# Patient Record
Sex: MALE | Race: WHITE | NOT HISPANIC OR LATINO | Employment: OTHER | URBAN - METROPOLITAN AREA
[De-identification: names, ages, dates, MRNs, and addresses within clinical notes are randomized per-mention and may not be internally consistent; named-entity substitution may affect disease eponyms.]

---

## 2018-04-01 ENCOUNTER — HOSPITAL ENCOUNTER (EMERGENCY)
Facility: HOSPITAL | Age: 78
Discharge: HOME | End: 2018-04-01
Attending: EMERGENCY MEDICINE
Payer: MEDICARE

## 2018-04-01 ENCOUNTER — APPOINTMENT (EMERGENCY)
Dept: RADIOLOGY | Facility: HOSPITAL | Age: 78
End: 2018-04-01
Attending: EMERGENCY MEDICINE
Payer: MEDICARE

## 2018-04-01 VITALS
RESPIRATION RATE: 18 BRPM | HEIGHT: 71 IN | DIASTOLIC BLOOD PRESSURE: 98 MMHG | BODY MASS INDEX: 29.12 KG/M2 | WEIGHT: 208 LBS | SYSTOLIC BLOOD PRESSURE: 186 MMHG | OXYGEN SATURATION: 100 % | HEART RATE: 52 BPM | TEMPERATURE: 97.8 F

## 2018-04-01 DIAGNOSIS — R55 SYNCOPE, UNSPECIFIED SYNCOPE TYPE: Primary | ICD-10-CM

## 2018-04-01 LAB
ANION GAP SERPL CALC-SCNC: 5 MEQ/L (ref 3–15)
BASOPHILS # BLD: 0.02 K/UL (ref 0.01–0.1)
BASOPHILS NFR BLD: 0.4 %
BUN SERPL-MCNC: 24 MG/DL (ref 8–20)
CALCIUM SERPL-MCNC: 7.9 MG/DL (ref 8.9–10.3)
CHLORIDE SERPL-SCNC: 104 MMOL/L (ref 98–109)
CO2 SERPL-SCNC: 26 MMOL/L (ref 22–32)
CREAT SERPL-MCNC: 1.2 MG/DL (ref 0.8–1.3)
EOSINOPHIL # BLD: 0.21 K/UL (ref 0.04–0.54)
EOSINOPHIL NFR BLD: 4.6 %
ERYTHROCYTE [DISTWIDTH] IN BLOOD BY AUTOMATED COUNT: 13.5 % (ref 11.6–14.4)
GFR SERPL CREATININE-BSD FRML MDRD: 58.4 ML/MIN/1.73M*2
GLUCOSE SERPL-MCNC: 84 MG/DL (ref 70–99)
HCT VFR BLDCO AUTO: 36.9 % (ref 40–51)
HGB BLD-MCNC: 12.2 G/DL (ref 13.7–17.5)
IMM GRANULOCYTES # BLD AUTO: 0.01 K/UL (ref 0–0.08)
IMM GRANULOCYTES NFR BLD AUTO: 0.2 %
LYMPHOCYTES # BLD: 1.01 K/UL (ref 1.2–3.5)
LYMPHOCYTES NFR BLD: 22.1 %
MCH RBC QN AUTO: 35.9 PG (ref 28–33.2)
MCHC RBC AUTO-ENTMCNC: 33.1 G/DL (ref 32.2–36.5)
MCV RBC AUTO: 108.5 FL (ref 83–98)
MONOCYTES # BLD: 0.28 K/UL (ref 0.3–1)
MONOCYTES NFR BLD: 6.1 %
NEUTROPHILS # BLD: 3.04 K/UL (ref 1.7–7)
NEUTS SEG NFR BLD: 66.6 %
NRBC BLD-RTO: 0 %
PDW BLD AUTO: 8.8 FL (ref 9.4–12.4)
PLATELET # BLD AUTO: 130 K/UL (ref 150–350)
POTASSIUM SERPL-SCNC: 4.7 MMOL/L (ref 3.6–5.1)
RAINBOW HOLD SPECIMEN: NORMAL
RBC # BLD AUTO: 3.4 M/UL (ref 4.5–5.8)
SODIUM SERPL-SCNC: 135 MMOL/L (ref 136–144)
TROPONIN I SERPL-MCNC: <0.02 NG/ML
WBC # BLD AUTO: 4.57 K/UL (ref 3.8–10.5)

## 2018-04-01 PROCEDURE — 96360 HYDRATION IV INFUSION INIT: CPT

## 2018-04-01 PROCEDURE — 99284 EMERGENCY DEPT VISIT MOD MDM: CPT | Mod: 25

## 2018-04-01 PROCEDURE — 93005 ELECTROCARDIOGRAM TRACING: CPT | Performed by: EMERGENCY MEDICINE

## 2018-04-01 PROCEDURE — 71046 X-RAY EXAM CHEST 2 VIEWS: CPT

## 2018-04-01 PROCEDURE — 3E0337Z INTRODUCTION OF ELECTROLYTIC AND WATER BALANCE SUBSTANCE INTO PERIPHERAL VEIN, PERCUTANEOUS APPROACH: ICD-10-PCS | Performed by: EMERGENCY MEDICINE

## 2018-04-01 PROCEDURE — 25800000 HC PHARMACY IV SOLUTIONS: Performed by: EMERGENCY MEDICINE

## 2018-04-01 PROCEDURE — 36415 COLL VENOUS BLD VENIPUNCTURE: CPT | Performed by: EMERGENCY MEDICINE

## 2018-04-01 PROCEDURE — 85025 COMPLETE CBC W/AUTO DIFF WBC: CPT | Performed by: EMERGENCY MEDICINE

## 2018-04-01 PROCEDURE — 80048 BASIC METABOLIC PNL TOTAL CA: CPT | Performed by: EMERGENCY MEDICINE

## 2018-04-01 PROCEDURE — 84484 ASSAY OF TROPONIN QUANT: CPT | Performed by: EMERGENCY MEDICINE

## 2018-04-01 RX ORDER — COLCHICINE 0.6 MG/1
TABLET ORAL
Refills: 0 | Status: ON HOLD | COMMUNITY
Start: 2018-02-06 | End: 2021-01-01 | Stop reason: SDUPTHER

## 2018-04-01 RX ORDER — LISINOPRIL 5 MG/1
5 TABLET ORAL DAILY
Status: ON HOLD | COMMUNITY
End: 2021-01-01 | Stop reason: ENTERED-IN-ERROR

## 2018-04-01 RX ORDER — ALLOPURINOL 100 MG/1
TABLET ORAL
Refills: 0 | Status: ON HOLD | COMMUNITY
Start: 2018-01-03 | End: 2021-01-01 | Stop reason: ENTERED-IN-ERROR

## 2018-04-01 RX ADMIN — SODIUM CHLORIDE 500 ML: 9 INJECTION, SOLUTION INTRAVENOUS at 10:57

## 2018-04-01 NOTE — ED PROVIDER NOTES
EM Resident Note  Chief Complaint: syncope  HPI  77 y.o. male past medical history renal cell carcinoma, gout, htn presenting to the ED with complaint as written above  Pt was in [employer] for Easter when he began feeling light headed. Syncopized while seated in chair and was helped to ground by family. No fall. No tonic clonic movements, tongue biting or incontinence. Upon awakening was not confused and knew where he was. No chest pain, palpitations, SOB, numbness or weakness. Currently feels normal. Before event in [employer] was in normal state of health.   Similar event happened at [employer] on Easter 5 years ago; was seen in ED with no diagnosis made.   Associated sympoms: light headed   Review of Systems  General: no fever, no chills  Skin: no new skin changes   Eyes: no vision changes  ENT: no ear pain, no sore throat  Respiratory: no cough, no shortness of breath, no sputum, no hemoptysis  Cardiovascular: no chest pain, no palpitations  GI: no abdominal pain, no vomiting, no diarrhea  : no frequency, no dysuria   MSK: no joint pain, no leg swelling, no pain  Neuro: no numbness, no weakness    Past History  Past Medical History:   Diagnosis Date   • Gout    • Hypertension    • Renal cancer, right (CMS/HCC) (HCC)      RCC s/p nephrectomy with recurrence now on chemo (oral pill)  Denies hx of CAD, CVA, Pe, DVT  No recent surgery/trauma  Not on beta blocker or rate controlling agent  Past Surgical History:   Procedure Laterality Date   • NEPHRECTOMY Right    • PROSTATECTOMY       Social History     Social History   • Marital status: Single     Spouse name: N/A   • Number of children: N/A   • Years of education: N/A     Occupational History   • Not on file.     Social History Main Topics   • Smoking status: Former Smoker   • Smokeless tobacco: Never Used   • Alcohol use No   • Drug use: No   • Sexual activity: Defer     Other Topics Concern   • Not on file     Social History Narrative   • No narrative on file     History  reviewed. No pertinent family history.  Not in a hospital admission.  No Known Allergies    Physical Exam:  General - alert and oriented x3, under no acute distress  Eyes -  normal sclera and conjunctiva  Head - normocephalic   Neck - normal appearing, trachea midline  Mouth - moist oral mucosa  Cardio - regular rate, regular rhythm, no murmurs  Respiratory - no increased work of breathing, lungs CTA b/l  Abdomen - soft; no tenderness to palpation  Extremities - circulation grossly intact, no extremity swelling, no calf tenderness  Skin: warm, no jaundice  Neuro - alert and awake, normal speech, intact strength and sensation all extremities  Psych- pleasant and cooperative    ED Triage Vitals   Temp Heart Rate Resp BP SpO2   04/01/18 1027 04/01/18 1059 04/01/18 1059 04/01/18 1059 04/01/18 1059   36.6 °C (97.8 °F) (!) 52 20 (!) 175/80 100 %      Temp Source Heart Rate Source Patient Position BP Location FiO2 (%) (Set)   04/01/18 1027 04/01/18 1027 04/01/18 1027 04/01/18 1027 --   Temporal Monitor Lying Right upper arm       MDM:   77 y.o. male after syncopal episode while in [employer]  - VSS; slight bradycardia. Not significant enough to explain patient's syncope, especially given context.  - neurologically intact  Differential diagnosis: vasovagal syncope  Plan: Place IV, labs, EKG, labs  ED Course:  ED Course as of Apr 01 2314   Sun Apr 01, 2018   1046 EKG: sinus bradycardia; normal axis; normal intervals; normal R/S transition; no Q waves; no ST segment changes; no T wave abnormalities; no acute ischemic changes; no findings predisposing to emergent arrythmia    [NP]   2314 Work up shows no abnormalities. Patient had no symptoms while in ED. Dc home.   [NP]      ED Course User Index  [NP] Allan Street MD         Clinical Impressions as of Apr 01 2314   Syncope, unspecified syncope type     Disposition Diagnoses:  1. Syncope, unspecified syncope type       Disposition:  Discharged to home     Allan Street  MD  Resident  04/01/18 1034

## 2018-04-01 NOTE — ED ATTESTATION NOTE
I saw and evaluated the patient.  I reviewed the resident's note and agree with the findings and plan as documented in the note except for my comments if noted below.    Patient is a 77-year-old male with past medical history significant for hypertension and renal cell carcinoma currently on chemotherapy, who presents emergency department complaining of syncopal episode at [employer].  The patient symptoms onset was just prior to arrival.  Patient was standing at [employer] which was unusually more crowded and hot.  The patient felt hot and diaphoretic.  The patient then had lightheadedness and syncopized.  The patient was caught by onlookers and lowered to ground.  The patient denies any head trauma, headache, neck pain, chest pain, dyspnea, abdominal pain, extremity trauma, recent illness, recent decreased p.o.'s or rash.  Patient symptoms have resolved in the emergency department.  The patient feels at his baseline currently.  Physical exam: Vital signs reviewed.  General: Well-developed well-nourished, sitting up in bed, appears comfortable.  Smiles.  HEENT: NCAT, pupils equal round react to light, EOMI, moist mucous membranes.  Neck: Supple, nontender, no meningismus, no JVD.  Chest: Lungs clear to auscultation bilaterally, no rales rhonchi or wheezes.  Heart: Regular rate and rhythm no murmurs rubs or gallops.  Abdomen: Soft, nontender, nondistended, normal active bowel sounds.  Extremities: No cyanosis clubbing or edema.  Skin: Warm and dry, no diaphoresis.  No rash.  Neuro: GCS of 15, awake and alert, 5/5 strength bilateral upper and lower extremities, sensory normal, cranial nerves II to XII intact, coordination normal, normal speech.    Plan: Check labs, EKG, IV fluids.    Impression: Acute syncopal episode.      I was physically present for the key/critical portions of the following procedures: None     Joey Espinoza MD  04/01/18 8250

## 2018-04-02 LAB
ATRIAL RATE: 49
P AXIS: 52
PR INTERVAL: 192
QRS DURATION: 112
QT INTERVAL: 464
QTC CALCULATION(BAZETT): 419
R AXIS: -18
T WAVE AXIS: 61
VENTRICULAR RATE: 49

## 2018-07-08 ENCOUNTER — HOSPITAL ENCOUNTER (EMERGENCY)
Facility: HOSPITAL | Age: 78
Discharge: HOME | End: 2018-07-08
Attending: EMERGENCY MEDICINE
Payer: MEDICARE

## 2018-07-08 VITALS
RESPIRATION RATE: 16 BRPM | DIASTOLIC BLOOD PRESSURE: 80 MMHG | SYSTOLIC BLOOD PRESSURE: 180 MMHG | BODY MASS INDEX: 27.72 KG/M2 | OXYGEN SATURATION: 96 % | TEMPERATURE: 97.6 F | HEIGHT: 71 IN | WEIGHT: 198 LBS | HEART RATE: 58 BPM

## 2018-07-08 DIAGNOSIS — E87.1 HYPONATREMIA: ICD-10-CM

## 2018-07-08 DIAGNOSIS — D64.9 CHRONIC ANEMIA: ICD-10-CM

## 2018-07-08 DIAGNOSIS — R55 VASOVAGAL SYNCOPE: Primary | ICD-10-CM

## 2018-07-08 DIAGNOSIS — E86.0 DEHYDRATION: ICD-10-CM

## 2018-07-08 LAB
ALBUMIN SERPL-MCNC: 2.8 G/DL (ref 3.4–5)
ALP SERPL-CCNC: 63 IU/L (ref 35–126)
ALT SERPL-CCNC: 31 IU/L (ref 16–63)
ANION GAP SERPL CALC-SCNC: 7 MEQ/L (ref 3–15)
AST SERPL-CCNC: 31 IU/L (ref 15–41)
BASOPHILS # BLD: 0.02 K/UL (ref 0.01–0.1)
BASOPHILS NFR BLD: 0.5 %
BILIRUB SERPL-MCNC: 0.7 MG/DL (ref 0.3–1.2)
BUN SERPL-MCNC: 31 MG/DL (ref 8–20)
CALCIUM SERPL-MCNC: 7.7 MG/DL (ref 8.9–10.3)
CHLORIDE SERPL-SCNC: 101 MMOL/L (ref 98–109)
CO2 SERPL-SCNC: 22 MMOL/L (ref 22–32)
CREAT SERPL-MCNC: 1.3 MG/DL (ref 0.8–1.3)
DIFFERENTIAL METHOD BLD: ABNORMAL
EOSINOPHIL # BLD: 0.24 K/UL (ref 0.04–0.54)
EOSINOPHIL NFR BLD: 6 %
ERYTHROCYTE [DISTWIDTH] IN BLOOD BY AUTOMATED COUNT: 14.3 % (ref 11.6–14.4)
GFR SERPL CREATININE-BSD FRML MDRD: 53.3 ML/MIN/1.73M*2
GLUCOSE SERPL-MCNC: 91 MG/DL (ref 70–99)
HCT VFR BLDCO AUTO: 35 % (ref 40–51)
HGB BLD-MCNC: 11.8 G/DL (ref 13.7–17.5)
IMM GRANULOCYTES # BLD AUTO: 0.01 K/UL (ref 0–0.08)
IMM GRANULOCYTES NFR BLD AUTO: 0.3 %
LYMPHOCYTES # BLD: 1 K/UL (ref 1.2–3.5)
LYMPHOCYTES NFR BLD: 25.1 %
MCH RBC QN AUTO: 36.1 PG (ref 28–33.2)
MCHC RBC AUTO-ENTMCNC: 33.7 G/DL (ref 32.2–36.5)
MCV RBC AUTO: 107 FL (ref 83–98)
MONOCYTES # BLD: 0.32 K/UL (ref 0.3–1)
MONOCYTES NFR BLD: 8 %
NEUTROPHILS # BLD: 2.39 K/UL (ref 1.7–7)
NEUTS SEG NFR BLD: 60.1 %
NRBC BLD-RTO: 0 %
PDW BLD AUTO: 9 FL (ref 9.4–12.4)
PLAT MORPH BLD: NORMAL
PLATELET # BLD AUTO: 133 K/UL (ref 150–350)
PLATELET # BLD EST: ABNORMAL 10*3/UL
POIKILOCYTOSIS BLD QL SMEAR: ABNORMAL
POTASSIUM SERPL-SCNC: 4.4 MMOL/L (ref 3.6–5.1)
PROT SERPL-MCNC: 5.2 G/DL (ref 6–8.2)
RAINBOW HOLD SPECIMEN: NORMAL
RBC # BLD AUTO: 3.27 M/UL (ref 4.5–5.8)
SODIUM SERPL-SCNC: 130 MMOL/L (ref 136–144)
TROPONIN I SERPL-MCNC: <0.02 NG/ML
WBC # BLD AUTO: 3.98 K/UL (ref 3.8–10.5)

## 2018-07-08 PROCEDURE — 80053 COMPREHEN METABOLIC PANEL: CPT | Performed by: PHYSICIAN ASSISTANT

## 2018-07-08 PROCEDURE — 99284 EMERGENCY DEPT VISIT MOD MDM: CPT | Mod: 25

## 2018-07-08 PROCEDURE — 93005 ELECTROCARDIOGRAM TRACING: CPT | Performed by: PHYSICIAN ASSISTANT

## 2018-07-08 PROCEDURE — 84484 ASSAY OF TROPONIN QUANT: CPT | Performed by: PHYSICIAN ASSISTANT

## 2018-07-08 PROCEDURE — 96360 HYDRATION IV INFUSION INIT: CPT

## 2018-07-08 PROCEDURE — 3E0337Z INTRODUCTION OF ELECTROLYTIC AND WATER BALANCE SUBSTANCE INTO PERIPHERAL VEIN, PERCUTANEOUS APPROACH: ICD-10-PCS | Performed by: EMERGENCY MEDICINE

## 2018-07-08 PROCEDURE — 96361 HYDRATE IV INFUSION ADD-ON: CPT

## 2018-07-08 PROCEDURE — 85025 COMPLETE CBC W/AUTO DIFF WBC: CPT | Performed by: PHYSICIAN ASSISTANT

## 2018-07-08 PROCEDURE — 36415 COLL VENOUS BLD VENIPUNCTURE: CPT | Performed by: PHYSICIAN ASSISTANT

## 2018-07-08 PROCEDURE — 25800000 HC PHARMACY IV SOLUTIONS: Performed by: PHYSICIAN ASSISTANT

## 2018-07-08 RX ADMIN — SODIUM CHLORIDE 1000 ML: 9 INJECTION, SOLUTION INTRAVENOUS at 12:30

## 2018-07-08 ASSESSMENT — ENCOUNTER SYMPTOMS
SYNCOPE: 1
DIAPHORESIS: 1
HEADACHES: 0
CHILLS: 0
FEVER: 0
SHORTNESS OF BREATH: 0
VOMITING: 0
LIGHT-HEADEDNESS: 1
DIZZINESS: 1
NAUSEA: 0
FATIGUE: 1
ABDOMINAL PAIN: 0

## 2018-07-08 NOTE — ED PROVIDER NOTES
HPI     Chief Complaint   Patient presents with   • Syncope     77 y.o. male presents to ED c/o syncope x today (1045). Pt reports that he was sitting at [employer] this morning when he became diaphoretic and dizzy. He describes the dizziness as lightheadedness. He notes that this morning, he felt more fatigued than normal. Per family, he had +LOC for about 1 min. Family denied apnea, shaking, incontinence seizure activity. Denies injuries or complaints. Pt has had this happen to him in the past, which the most recent being Easter. He states that he was eval and they attributed the LOC to dehydration. Pt states that he has not had dec intake of PO fluids/food  Denies CP, SOB, fever, chills, abd pain, NV, HA, rash, vertiginous dizziness      History provided by:  Patient   used: No    Syncope   Episode history:  Single  Most recent episode:  Today  Timing:  Constant  Progression:  Unchanged  Chronicity:  New  Context: dehydration and sitting down    Witnessed: yes    Relieved by:  None tried  Worsened by:  Nothing  Ineffective treatments:  None tried  Associated symptoms: diaphoresis and dizziness    Associated symptoms: no chest pain, no fever, no headaches, no nausea, no shortness of breath and no vomiting         Patient History     Past Medical History:   Diagnosis Date   • Gout    • Hypertension    • Renal cancer, right (CMS/HCC) (HCC)        Past Surgical History:   Procedure Laterality Date   • NEPHRECTOMY Right    • PROSTATECTOMY         History reviewed. No pertinent family history.    Social History   Substance Use Topics   • Smoking status: Former Smoker   • Smokeless tobacco: Never Used   • Alcohol use No       Systems Reviewed from Nursing Triage:          Review of Systems     Review of Systems   Constitutional: Positive for diaphoresis and fatigue. Negative for chills and fever.   Respiratory: Negative for shortness of breath.    Cardiovascular: Positive for syncope. Negative for chest  "pain.   Gastrointestinal: Negative for abdominal pain, nausea and vomiting.   Skin: Negative for rash.   Neurological: Positive for dizziness, syncope and light-headedness. Negative for headaches.        Physical Exam     ED Triage Vitals [07/08/18 1139]   Temp Pulse Resp BP SpO2   (!) 36 °C (96.8 °F) -- 16 (!) 169/85 96 %      Temp Source Heart Rate Source Patient Position BP Location FiO2 (%) (Set)   Tympanic Monitor Lying Right upper arm --                     Patient Vitals for the past 24 hrs:   BP Temp Temp src Resp SpO2 Height Weight   07/08/18 1139 (!) 169/85 (!) 36 °C (96.8 °F) Tympanic 16 96 % 1.803 m (5' 11\") 89.8 kg (198 lb)           Physical Exam   Constitutional: He is oriented to person, place, and time. He appears well-developed and well-nourished. No distress.   HENT:   Head: Normocephalic and atraumatic.   Mouth/Throat: Oropharynx is clear and moist and mucous membranes are normal. Mucous membranes are not dry.   Eyes: Conjunctivae and EOM are normal. Pupils are equal, round, and reactive to light.   Neck: Normal range of motion.   Cardiovascular: Normal rate, regular rhythm, normal heart sounds and intact distal pulses.    No murmur heard.  Pulmonary/Chest: Effort normal and breath sounds normal. No respiratory distress. He exhibits no tenderness.   Abdominal: Soft. Normal appearance. He exhibits no distension and no mass. There is no tenderness.   Musculoskeletal: Normal range of motion. He exhibits no edema or tenderness.   Neurological: He is alert and oriented to person, place, and time. He has normal strength. He is not disoriented. No cranial nerve deficit or sensory deficit. He exhibits normal muscle tone. He displays a negative Romberg sign. Coordination normal.   Skin: Skin is warm, dry and intact. Capillary refill takes less than 2 seconds. No rash noted. No erythema.   Psychiatric: He has a normal mood and affect. His behavior is normal.   Nursing note and vitals reviewed.       "     ECG 12 lead  Date/Time: 7/8/2018 11:34 AM  Performed by: HAVEN GREGORY  Authorized by: JIMENA BETH     ECG reviewed by ED Physician in the absence of a cardiologist: yes    Interpretation:     Interpretation: abnormal    Rate:     ECG rate:  47    ECG rate assessment: bradycardic    Rhythm:     Rhythm: sinus rhythm    Ectopy:     Ectopy: none    QRS:     QRS axis:  Normal  Conduction:     Conduction: normal          ED Course & MDM     Labs Reviewed - No data to display    No orders to display           MDM         ED Course as of Jul 08 1732   Sun Jul 08, 2018   1320 I: syncope  Labs/tests show NAD  P: consult cardiology  [JCARLOS]   1329 D/w Dr Quinones who agreed that pt should have cardiac workup but can be seen outpatient. He recommended f/u tomorrow to be seen this week.  P: d/c home c cardioloy follow up tomorrow  [JCARLOS]      ED Course User Index  [JCALROS] VICKIE Wood         Clinical Impressions as of Jul 08 1732   Vasovagal syncope   Hyponatremia   Chronic anemia   Dehydration       By signing my name below, I, Torrey Goyal, attest that this documentation has been prepared under the direction and in the presence of LUNA Gregory PA-C.    7/8/2018 12:13 PM      Haven LEE, personally performed the services described in this documentation, as documented by the scribe in my presence, and it is both accurate and complete.  7/8/2018 1:32 PM           Torrey Goyal  07/08/18 1215       VICKIE Wood  07/08/18 1734

## 2018-07-08 NOTE — ED ATTESTATION NOTE
7/8/20181:20 PM  I have personally seen and examined the patient.  I reviewed and agree with the PA/NP/Resident's assessment and plan of care.    My examination, assessment, and plan of care of Marshall Garrison is as follows:  The patient presents with a brief syncopal episode today while at [employer].  The patient has had several episodes of syncope in the past 2 others at [employer] and one other at a restaurant.  All of the syncopal episodes have been within the past 2 years.  He denies any prodromal symptoms of headache or chest pain or shortness of breath or nausea or vomiting or diaphoresis.  This morning he felt fatigued but otherwise felt well.  There is no nausea or vomiting today he denies any headache or dizziness currently.  Denies chest pain or shortness of breath at this time.      Exam: He is awake alert and oriented ×3 and is in no acute distress.  Heart is regular with no murmurs.  Lungs are clear to auscultation bilaterally.  Abdomen is soft nontender with good bowel sounds.  Extremities have no edema.  Moves all extremities well there is good distal pulses.  Neuro exam is nonfocal with a normal motor and sensory and cranial nerve exam.      Impression/Plan: Patient has had what appears to be a brief vasovagal syncopal episodes over the past 2 years.  There is never been a neurologic or cardiac workup therefore we will speak with cardiology today to see if they would like to consider admission to get this done versus discharging to evaluate him further as an outpatient.  Family is concerned that these episodes continue to occur.  Patient is asymptomatic and her ER.     I was physically present for the key/critical portions of the following procedures: None     Gumaro Harrison,   07/08/18 6480

## 2018-07-09 LAB
ATRIAL RATE: 47
P AXIS: 75
PR INTERVAL: 178
QRS DURATION: 98
QT INTERVAL: 462
QTC CALCULATION(BAZETT): 408
R AXIS: -12
T WAVE AXIS: 36
VENTRICULAR RATE: 47

## 2021-01-01 ENCOUNTER — APPOINTMENT (EMERGENCY)
Dept: RADIOLOGY | Facility: HOSPITAL | Age: 81
DRG: 552 | End: 2021-01-01
Attending: EMERGENCY MEDICINE
Payer: MEDICARE

## 2021-01-01 ENCOUNTER — HOSPITAL ENCOUNTER (INPATIENT)
Facility: HOSPITAL | Age: 81
LOS: 8 days | Discharge: NURSING, REHAB OR LONG-TERM CARE | DRG: 552 | End: 2021-12-05
Attending: EMERGENCY MEDICINE | Admitting: STUDENT IN AN ORGANIZED HEALTH CARE EDUCATION/TRAINING PROGRAM
Payer: MEDICARE

## 2021-01-01 ENCOUNTER — APPOINTMENT (INPATIENT)
Dept: RADIOLOGY | Facility: HOSPITAL | Age: 81
DRG: 552 | End: 2021-01-01
Attending: STUDENT IN AN ORGANIZED HEALTH CARE EDUCATION/TRAINING PROGRAM
Payer: MEDICARE

## 2021-01-01 ENCOUNTER — APPOINTMENT (INPATIENT)
Dept: RADIOLOGY | Facility: HOSPITAL | Age: 81
DRG: 552 | End: 2021-01-01
Payer: MEDICARE

## 2021-01-01 ENCOUNTER — APPOINTMENT (INPATIENT)
Dept: RADIOLOGY | Facility: HOSPITAL | Age: 81
DRG: 552 | End: 2021-01-01
Attending: HOSPITALIST
Payer: MEDICARE

## 2021-01-01 ENCOUNTER — APPOINTMENT (INPATIENT)
Dept: CARDIOLOGY | Facility: HOSPITAL | Age: 81
DRG: 552 | End: 2021-01-01
Attending: STUDENT IN AN ORGANIZED HEALTH CARE EDUCATION/TRAINING PROGRAM
Payer: MEDICARE

## 2021-01-01 VITALS
SYSTOLIC BLOOD PRESSURE: 148 MMHG | HEIGHT: 71 IN | BODY MASS INDEX: 27.72 KG/M2 | TEMPERATURE: 97.5 F | HEART RATE: 79 BPM | RESPIRATION RATE: 18 BRPM | OXYGEN SATURATION: 96 % | DIASTOLIC BLOOD PRESSURE: 79 MMHG | WEIGHT: 198 LBS

## 2021-01-01 DIAGNOSIS — R29.898 LEFT LEG WEAKNESS: Primary | ICD-10-CM

## 2021-01-01 DIAGNOSIS — R06.02 SHORTNESS OF BREATH: ICD-10-CM

## 2021-01-01 LAB
ALBUMIN SERPL-MCNC: 2.5 G/DL (ref 3.4–5)
ALBUMIN SERPL-MCNC: 3 G/DL (ref 3.4–5)
ALP SERPL-CCNC: 123 IU/L (ref 35–126)
ALP SERPL-CCNC: 135 IU/L (ref 35–126)
ALT SERPL-CCNC: 15 IU/L (ref 16–63)
ALT SERPL-CCNC: 18 IU/L (ref 16–63)
ANION GAP SERPL CALC-SCNC: 10 MEQ/L (ref 3–15)
ANION GAP SERPL CALC-SCNC: 10 MEQ/L (ref 3–15)
ANION GAP SERPL CALC-SCNC: 11 MEQ/L (ref 3–15)
ANION GAP SERPL CALC-SCNC: 12 MEQ/L (ref 3–15)
ANION GAP SERPL CALC-SCNC: 9 MEQ/L (ref 3–15)
AORTIC ROOT ANNULUS: 3.5 CM
AORTIC VALVE MEAN VELOCITY: 0.86 M/S
AORTIC VALVE VELOCITY TIME INTEGRAL: 23.9 CM
APTT PPP: 137 SEC (ref 23–35)
APTT PPP: 139 SEC (ref 23–35)
APTT PPP: 147 SEC (ref 23–35)
APTT PPP: 35 SEC (ref 23–35)
APTT PPP: 59 SEC (ref 23–35)
APTT PPP: 75 SEC (ref 23–35)
APTT PPP: 95 SEC (ref 23–35)
APTT PPP: 99 SEC (ref 23–35)
APTT PPP: >230 SEC (ref 23–35)
ASCENDING AORTA: 3.7 CM
AST SERPL-CCNC: 25 IU/L (ref 15–41)
AST SERPL-CCNC: 27 IU/L (ref 15–41)
ATRIAL RATE: 54
AV MEAN GRADIENT: 3 MMHG
AV PEAK GRADIENT: 6 MMHG
AV PEAK VELOCITY-S: 1.2 M/S
AV VALVE AREA: 1.61 CM2
AV VALVE AREA: 1.63 CM2
BACTERIA UR CULT: NORMAL
BACTERIA UR CULT: NORMAL
BACTERIA URNS QL MICRO: ABNORMAL /HPF
BASOPHILS # BLD: 0.02 K/UL (ref 0.01–0.1)
BASOPHILS NFR BLD: 0.4 %
BILIRUB DIRECT SERPL-MCNC: 0.1 MG/DL
BILIRUB SERPL-MCNC: 0.6 MG/DL (ref 0.3–1.2)
BILIRUB SERPL-MCNC: 0.8 MG/DL (ref 0.3–1.2)
BILIRUB UR QL STRIP.AUTO: NEGATIVE MG/DL
BNP SERPL-MCNC: 284 PG/ML
BSA FOR ECHO PROCEDURE: 2.12 M2
BUN SERPL-MCNC: 51 MG/DL (ref 8–20)
BUN SERPL-MCNC: 54 MG/DL (ref 8–20)
BUN SERPL-MCNC: 56 MG/DL (ref 8–20)
BUN SERPL-MCNC: 60 MG/DL (ref 8–20)
BUN SERPL-MCNC: 66 MG/DL (ref 8–20)
BUN SERPL-MCNC: 81 MG/DL (ref 8–20)
BUN SERPL-MCNC: 92 MG/DL (ref 8–20)
BUN SERPL-MCNC: 93 MG/DL (ref 8–20)
CALCIUM SERPL-MCNC: 7.8 MG/DL (ref 8.9–10.3)
CALCIUM SERPL-MCNC: 7.9 MG/DL (ref 8.9–10.3)
CALCIUM SERPL-MCNC: 7.9 MG/DL (ref 8.9–10.3)
CALCIUM SERPL-MCNC: 8 MG/DL (ref 8.9–10.3)
CALCIUM SERPL-MCNC: 8.2 MG/DL (ref 8.9–10.3)
CALCIUM SERPL-MCNC: 8.2 MG/DL (ref 8.9–10.3)
CHLORIDE SERPL-SCNC: 106 MEQ/L (ref 98–109)
CHLORIDE SERPL-SCNC: 107 MEQ/L (ref 98–109)
CHLORIDE SERPL-SCNC: 107 MEQ/L (ref 98–109)
CHLORIDE SERPL-SCNC: 108 MEQ/L (ref 98–109)
CHLORIDE SERPL-SCNC: 108 MEQ/L (ref 98–109)
CHLORIDE SERPL-SCNC: 111 MEQ/L (ref 98–109)
CHLORIDE SERPL-SCNC: 113 MEQ/L (ref 98–109)
CHLORIDE SERPL-SCNC: 114 MEQ/L (ref 98–109)
CHOLEST SERPL-MCNC: 131 MG/DL
CK SERPL-CCNC: 33 U/L (ref 16–300)
CLARITY UR REFRACT.AUTO: CLEAR
CO2 SERPL-SCNC: 14 MEQ/L (ref 22–32)
CO2 SERPL-SCNC: 15 MEQ/L (ref 22–32)
CO2 SERPL-SCNC: 16 MEQ/L (ref 22–32)
CO2 SERPL-SCNC: 17 MEQ/L (ref 22–32)
CO2 SERPL-SCNC: 18 MEQ/L (ref 22–32)
CO2 SERPL-SCNC: 19 MEQ/L (ref 22–32)
CO2 SERPL-SCNC: 20 MEQ/L (ref 22–32)
CO2 SERPL-SCNC: 20 MEQ/L (ref 22–32)
COLOR UR AUTO: YELLOW
CREAT SERPL-MCNC: 2.3 MG/DL (ref 0.8–1.3)
CREAT SERPL-MCNC: 2.4 MG/DL (ref 0.8–1.3)
CREAT SERPL-MCNC: 2.4 MG/DL (ref 0.8–1.3)
CREAT SERPL-MCNC: 2.6 MG/DL (ref 0.8–1.3)
CREAT SERPL-MCNC: 2.7 MG/DL (ref 0.8–1.3)
CREAT SERPL-MCNC: 2.7 MG/DL (ref 0.8–1.3)
CREAT SERPL-MCNC: 2.8 MG/DL (ref 0.8–1.3)
CREAT SERPL-MCNC: 2.9 MG/DL (ref 0.8–1.3)
D DIMER PPP IA.FEU-MCNC: 1.95 UG/ML FEU (ref 0–0.5)
DIFFERENTIAL METHOD BLD: ABNORMAL
DOP CALC LVOT STROKE VOLUME: 38.94 ML
E WAVE DECELERATION TIME: 391 MS
E/A RATIO: 0.7
EOSINOPHIL # BLD: 0.08 K/UL (ref 0.04–0.54)
EOSINOPHIL NFR BLD: 1.5 %
ERYTHROCYTE [DISTWIDTH] IN BLOOD BY AUTOMATED COUNT: 15.9 % (ref 11.6–14.4)
ERYTHROCYTE [DISTWIDTH] IN BLOOD BY AUTOMATED COUNT: 16.1 % (ref 11.6–14.4)
ERYTHROCYTE [DISTWIDTH] IN BLOOD BY AUTOMATED COUNT: 16.5 % (ref 11.6–14.4)
ERYTHROCYTE [DISTWIDTH] IN BLOOD BY AUTOMATED COUNT: 16.6 % (ref 11.6–14.4)
ERYTHROCYTE [DISTWIDTH] IN BLOOD BY AUTOMATED COUNT: 16.7 % (ref 11.6–14.4)
ERYTHROCYTE [DISTWIDTH] IN BLOOD BY AUTOMATED COUNT: 16.7 % (ref 11.6–14.4)
ERYTHROCYTE [DISTWIDTH] IN BLOOD BY AUTOMATED COUNT: 16.8 % (ref 11.6–14.4)
ERYTHROCYTE [DISTWIDTH] IN BLOOD BY AUTOMATED COUNT: 17 % (ref 11.6–14.4)
EST RIGHT VENT SYSTOLIC PRESSURE BY TRICUSPID REGURGITATION JET: 25 MMHG
FLUAV RNA SPEC QL NAA+PROBE: NEGATIVE
FLUBV RNA SPEC QL NAA+PROBE: NEGATIVE
FRACTIONAL SHORTENING: 42.1 %
GFR SERPL CREATININE-BSD FRML MDRD: 20.9 ML/MIN/1.73M*2
GFR SERPL CREATININE-BSD FRML MDRD: 21.7 ML/MIN/1.73M*2
GFR SERPL CREATININE-BSD FRML MDRD: 22.7 ML/MIN/1.73M*2
GFR SERPL CREATININE-BSD FRML MDRD: 22.7 ML/MIN/1.73M*2
GFR SERPL CREATININE-BSD FRML MDRD: 23.7 ML/MIN/1.73M*2
GFR SERPL CREATININE-BSD FRML MDRD: 26 ML/MIN/1.73M*2
GFR SERPL CREATININE-BSD FRML MDRD: 26 ML/MIN/1.73M*2
GFR SERPL CREATININE-BSD FRML MDRD: 27.3 ML/MIN/1.73M*2
GLUCOSE SERPL-MCNC: 110 MG/DL (ref 70–99)
GLUCOSE SERPL-MCNC: 124 MG/DL (ref 70–99)
GLUCOSE SERPL-MCNC: 126 MG/DL (ref 70–99)
GLUCOSE SERPL-MCNC: 131 MG/DL (ref 70–99)
GLUCOSE SERPL-MCNC: 89 MG/DL (ref 70–99)
GLUCOSE SERPL-MCNC: 93 MG/DL (ref 70–99)
GLUCOSE SERPL-MCNC: 98 MG/DL (ref 70–99)
GLUCOSE SERPL-MCNC: 98 MG/DL (ref 70–99)
GLUCOSE UR STRIP.AUTO-MCNC: ABNORMAL MG/DL
HCT VFR BLDCO AUTO: 29.1 % (ref 40.1–51)
HCT VFR BLDCO AUTO: 29.5 % (ref 40.1–51)
HCT VFR BLDCO AUTO: 30.1 % (ref 40.1–51)
HCT VFR BLDCO AUTO: 31.6 % (ref 40.1–51)
HCT VFR BLDCO AUTO: 31.9 % (ref 40.1–51)
HCT VFR BLDCO AUTO: 32 % (ref 40.1–51)
HCT VFR BLDCO AUTO: 32.4 % (ref 40.1–51)
HCT VFR BLDCO AUTO: 34.5 % (ref 40.1–51)
HDLC SERPL-MCNC: 41 MG/DL
HDLC SERPL: 3.2 {RATIO}
HGB BLD-MCNC: 10 G/DL (ref 13.7–17.5)
HGB BLD-MCNC: 10.1 G/DL (ref 13.7–17.5)
HGB BLD-MCNC: 10.8 G/DL (ref 13.7–17.5)
HGB BLD-MCNC: 9.2 G/DL (ref 13.7–17.5)
HGB BLD-MCNC: 9.3 G/DL (ref 13.7–17.5)
HGB BLD-MCNC: 9.4 G/DL (ref 13.7–17.5)
HGB BLD-MCNC: 9.9 G/DL (ref 13.7–17.5)
HGB BLD-MCNC: 9.9 G/DL (ref 13.7–17.5)
HGB UR QL STRIP.AUTO: NEGATIVE
HYALINE CASTS #/AREA URNS LPF: ABNORMAL /LPF
IMM GRANULOCYTES # BLD AUTO: 0.03 K/UL (ref 0–0.08)
IMM GRANULOCYTES NFR BLD AUTO: 0.6 %
INR PPP: 1.2
INTERVENTRICULAR SEPTUM: 0.99 CM
KETONES UR STRIP.AUTO-MCNC: NEGATIVE MG/DL
LA/AORTA RATIO: 0.83
LAD 2D: 2.9 CM
LDLC SERPL CALC-MCNC: 77 MG/DL
LEFT INTERNAL DIMENSION IN SYSTOLE: 2.9 CM (ref 3.24–4.91)
LEFT VENTRICULAR INTERNAL DIMENSION IN DIASTOLE: 5.01 CM (ref 5.54–7.69)
LEFT VENTRICULAR POSTERIOR WALL IN END DIASTOLE: 0.91 CM (ref 0.7–1.31)
LEUKOCYTE ESTERASE UR QL STRIP.AUTO: NEGATIVE
LVOT 2D: 2 CM
LVOT A: 3.14 CM2
LVOT MG: 1 MMHG
LVOT MV: 0.45 M/S
LVOT PEAK VELOCITY: 0.61 M/S
LVOT VTI: 12.4 CM
LYMPHOCYTES # BLD: 0.43 K/UL (ref 1.2–3.5)
LYMPHOCYTES NFR BLD: 7.9 %
MAGNESIUM SERPL-MCNC: 1.9 MG/DL (ref 1.8–2.5)
MAGNESIUM SERPL-MCNC: 1.9 MG/DL (ref 1.8–2.5)
MAGNESIUM SERPL-MCNC: 2 MG/DL (ref 1.8–2.5)
MCH RBC QN AUTO: 30.5 PG (ref 28–33.2)
MCH RBC QN AUTO: 30.6 PG (ref 28–33.2)
MCH RBC QN AUTO: 30.7 PG (ref 28–33.2)
MCH RBC QN AUTO: 30.7 PG (ref 28–33.2)
MCH RBC QN AUTO: 31 PG (ref 28–33.2)
MCH RBC QN AUTO: 31.2 PG (ref 28–33.2)
MCH RBC QN AUTO: 31.3 PG (ref 28–33.2)
MCH RBC QN AUTO: 31.5 PG (ref 28–33.2)
MCHC RBC AUTO-ENTMCNC: 30.9 G/DL (ref 32.2–36.5)
MCHC RBC AUTO-ENTMCNC: 30.9 G/DL (ref 32.2–36.5)
MCHC RBC AUTO-ENTMCNC: 31.2 G/DL (ref 32.2–36.5)
MCHC RBC AUTO-ENTMCNC: 31.3 G/DL (ref 32.2–36.5)
MCHC RBC AUTO-ENTMCNC: 31.6 G/DL (ref 32.2–36.5)
MCHC RBC AUTO-ENTMCNC: 31.9 G/DL (ref 32.2–36.5)
MCV RBC AUTO: 100 FL (ref 83–98)
MCV RBC AUTO: 100.3 FL (ref 83–98)
MCV RBC AUTO: 97.3 FL (ref 83–98)
MCV RBC AUTO: 97.7 FL (ref 83–98)
MCV RBC AUTO: 98.1 FL (ref 83–98)
MCV RBC AUTO: 98.3 FL (ref 83–98)
MCV RBC AUTO: 99.1 FL (ref 83–98)
MCV RBC AUTO: 99.7 FL (ref 83–98)
MONOCYTES # BLD: 0.24 K/UL (ref 0.3–1)
MONOCYTES NFR BLD: 4.4 %
MV PEAK A VEL: 0.8 M/S
MV PEAK E VEL: 0.52 M/S
NEUTROPHILS # BLD: 4.62 K/UL (ref 1.7–7)
NEUTS SEG NFR BLD: 85.2 %
NITRITE UR QL STRIP.AUTO: NEGATIVE
NONHDLC SERPL-MCNC: 90 MG/DL
NRBC BLD-RTO: 0 %
P AXIS: 34
PDW BLD AUTO: 8.5 FL (ref 9.4–12.4)
PDW BLD AUTO: 8.7 FL (ref 9.4–12.4)
PDW BLD AUTO: 8.9 FL (ref 9.4–12.4)
PDW BLD AUTO: 8.9 FL (ref 9.4–12.4)
PDW BLD AUTO: 9.4 FL (ref 9.4–12.4)
PDW BLD AUTO: 9.6 FL (ref 9.4–12.4)
PDW BLD AUTO: 9.6 FL (ref 9.4–12.4)
PDW BLD AUTO: 9.7 FL (ref 9.4–12.4)
PH UR STRIP.AUTO: 6 [PH]
PLATELET # BLD AUTO: 106 K/UL (ref 150–350)
PLATELET # BLD AUTO: 111 K/UL (ref 150–350)
PLATELET # BLD AUTO: 111 K/UL (ref 150–350)
PLATELET # BLD AUTO: 129 K/UL (ref 150–350)
PLATELET # BLD AUTO: 179 K/UL (ref 150–350)
PLATELET # BLD AUTO: 202 K/UL (ref 150–350)
PLATELET # BLD AUTO: 251 K/UL (ref 150–350)
PLATELET # BLD AUTO: 271 K/UL (ref 150–350)
POSTERIOR WALL: 0.91 CM
POTASSIUM SERPL-SCNC: 4 MEQ/L (ref 3.6–5.1)
POTASSIUM SERPL-SCNC: 4.3 MEQ/L (ref 3.6–5.1)
POTASSIUM SERPL-SCNC: 4.3 MEQ/L (ref 3.6–5.1)
POTASSIUM SERPL-SCNC: 4.5 MEQ/L (ref 3.6–5.1)
POTASSIUM SERPL-SCNC: 4.5 MEQ/L (ref 3.6–5.1)
POTASSIUM SERPL-SCNC: 4.6 MEQ/L (ref 3.6–5.1)
POTASSIUM SERPL-SCNC: 4.9 MEQ/L (ref 3.6–5.1)
POTASSIUM SERPL-SCNC: 5.1 MEQ/L (ref 3.6–5.1)
PR INTERVAL: 182
PROT SERPL-MCNC: 5.4 G/DL (ref 6–8.2)
PROT SERPL-MCNC: 6.6 G/DL (ref 6–8.2)
PROT UR QL STRIP.AUTO: 3
PROTHROMBIN TIME: 14.3 SEC (ref 12.2–14.5)
QRS DURATION: 104
QT INTERVAL: 438
QTC CALCULATION(BAZETT): 415
R AXIS: -22
RBC # BLD AUTO: 2.92 M/UL (ref 4.5–5.8)
RBC # BLD AUTO: 3 M/UL (ref 4.5–5.8)
RBC # BLD AUTO: 3 M/UL (ref 4.5–5.8)
RBC # BLD AUTO: 3.22 M/UL (ref 4.5–5.8)
RBC # BLD AUTO: 3.23 M/UL (ref 4.5–5.8)
RBC # BLD AUTO: 3.24 M/UL (ref 4.5–5.8)
RBC # BLD AUTO: 3.28 M/UL (ref 4.5–5.8)
RBC # BLD AUTO: 3.53 M/UL (ref 4.5–5.8)
RBC #/AREA URNS HPF: ABNORMAL /HPF
RSV RNA SPEC QL NAA+PROBE: NEGATIVE
SARS-COV-2 RNA RESP QL NAA+PROBE: NEGATIVE
SARS-COV-2 RNA RESP QL NAA+PROBE: NEGATIVE
SODIUM SERPL-SCNC: 135 MEQ/L (ref 136–144)
SODIUM SERPL-SCNC: 136 MEQ/L (ref 136–144)
SODIUM SERPL-SCNC: 137 MEQ/L (ref 136–144)
SODIUM SERPL-SCNC: 137 MEQ/L (ref 136–144)
SODIUM SERPL-SCNC: 139 MEQ/L (ref 136–144)
SP GR UR REFRACT.AUTO: 1.01
SQUAMOUS URNS QL MICRO: 1 /HPF
T WAVE AXIS: 40
T4 FREE SERPL-MCNC: 0.81 NG/DL (ref 0.58–1.64)
TAPSE: 2.76 CM
TR MAX PG: 17 MMHG
TRICUSPID VALVE PEAK REGURGITATION VELOCITY: 2.09 M/S
TRIGL SERPL-MCNC: 67 MG/DL (ref 30–149)
TROPONIN I SERPL-MCNC: <0.02 NG/ML
TSH SERPL DL<=0.05 MIU/L-ACNC: 15.56 MIU/L (ref 0.34–5.6)
UROBILINOGEN UR STRIP-ACNC: 0.2 EU/DL
VENTRICULAR RATE: 54
WBC # BLD AUTO: 11.95 K/UL (ref 3.8–10.5)
WBC # BLD AUTO: 12.3 K/UL (ref 3.8–10.5)
WBC # BLD AUTO: 5.42 K/UL (ref 3.8–10.5)
WBC # BLD AUTO: 6.13 K/UL (ref 3.8–10.5)
WBC # BLD AUTO: 7.1 K/UL (ref 3.8–10.5)
WBC # BLD AUTO: 7.14 K/UL (ref 3.8–10.5)
WBC # BLD AUTO: 8.9 K/UL (ref 3.8–10.5)
WBC # BLD AUTO: 9.95 K/UL (ref 3.8–10.5)
WBC #/AREA URNS HPF: ABNORMAL /HPF
Z-SCORE OF LEFT VENTRICULAR DIMENSION IN END DIASTOLE: -2.65
Z-SCORE OF LEFT VENTRICULAR DIMENSION IN END SYSTOLE: -2.52
Z-SCORE OF LEFT VENTRICULAR POSTERIOR WALL IN END DIASTOLE: -0.26

## 2021-01-01 PROCEDURE — 80048 BASIC METABOLIC PNL TOTAL CA: CPT | Performed by: HOSPITALIST

## 2021-01-01 PROCEDURE — 85730 THROMBOPLASTIN TIME PARTIAL: CPT | Performed by: STUDENT IN AN ORGANIZED HEALTH CARE EDUCATION/TRAINING PROGRAM

## 2021-01-01 PROCEDURE — 83880 ASSAY OF NATRIURETIC PEPTIDE: CPT | Performed by: NURSE PRACTITIONER

## 2021-01-01 PROCEDURE — 63700000 HC SELF-ADMINISTRABLE DRUG: Performed by: STUDENT IN AN ORGANIZED HEALTH CARE EDUCATION/TRAINING PROGRAM

## 2021-01-01 PROCEDURE — 63700000 HC SELF-ADMINISTRABLE DRUG: Performed by: HOSPITALIST

## 2021-01-01 PROCEDURE — 99223 1ST HOSP IP/OBS HIGH 75: CPT | Performed by: STUDENT IN AN ORGANIZED HEALTH CARE EDUCATION/TRAINING PROGRAM

## 2021-01-01 PROCEDURE — G1004 CDSM NDSC: HCPCS

## 2021-01-01 PROCEDURE — 99232 SBSQ HOSP IP/OBS MODERATE 35: CPT | Performed by: HOSPITALIST

## 2021-01-01 PROCEDURE — 99222 1ST HOSP IP/OBS MODERATE 55: CPT | Performed by: INTERNAL MEDICINE

## 2021-01-01 PROCEDURE — 63600000 HC DRUGS/DETAIL CODE: Performed by: HOSPITALIST

## 2021-01-01 PROCEDURE — 97162 PT EVAL MOD COMPLEX 30 MIN: CPT | Mod: GP

## 2021-01-01 PROCEDURE — 80053 COMPREHEN METABOLIC PANEL: CPT | Performed by: EMERGENCY MEDICINE

## 2021-01-01 PROCEDURE — 21400000 HC ROOM AND CARE CCU/INTERMEDIATE

## 2021-01-01 PROCEDURE — 25800000 HC PHARMACY IV SOLUTIONS: Performed by: HOSPITALIST

## 2021-01-01 PROCEDURE — 99231 SBSQ HOSP IP/OBS SF/LOW 25: CPT | Performed by: INTERNAL MEDICINE

## 2021-01-01 PROCEDURE — 99221 1ST HOSP IP/OBS SF/LOW 40: CPT | Performed by: NEUROLOGICAL SURGERY

## 2021-01-01 PROCEDURE — 36415 COLL VENOUS BLD VENIPUNCTURE: CPT | Performed by: EMERGENCY MEDICINE

## 2021-01-01 PROCEDURE — 99285 EMERGENCY DEPT VISIT HI MDM: CPT | Mod: 25

## 2021-01-01 PROCEDURE — 84443 ASSAY THYROID STIM HORMONE: CPT | Performed by: STUDENT IN AN ORGANIZED HEALTH CARE EDUCATION/TRAINING PROGRAM

## 2021-01-01 PROCEDURE — 99291 CRITICAL CARE FIRST HOUR: CPT | Mod: 25

## 2021-01-01 PROCEDURE — 83735 ASSAY OF MAGNESIUM: CPT | Performed by: STUDENT IN AN ORGANIZED HEALTH CARE EDUCATION/TRAINING PROGRAM

## 2021-01-01 PROCEDURE — 85379 FIBRIN DEGRADATION QUANT: CPT | Performed by: STUDENT IN AN ORGANIZED HEALTH CARE EDUCATION/TRAINING PROGRAM

## 2021-01-01 PROCEDURE — 84439 ASSAY OF FREE THYROXINE: CPT | Performed by: STUDENT IN AN ORGANIZED HEALTH CARE EDUCATION/TRAINING PROGRAM

## 2021-01-01 PROCEDURE — 85027 COMPLETE CBC AUTOMATED: CPT | Performed by: HOSPITALIST

## 2021-01-01 PROCEDURE — 85610 PROTHROMBIN TIME: CPT | Performed by: STUDENT IN AN ORGANIZED HEALTH CARE EDUCATION/TRAINING PROGRAM

## 2021-01-01 PROCEDURE — 82550 ASSAY OF CK (CPK): CPT | Performed by: STUDENT IN AN ORGANIZED HEALTH CARE EDUCATION/TRAINING PROGRAM

## 2021-01-01 PROCEDURE — 36415 COLL VENOUS BLD VENIPUNCTURE: CPT | Performed by: HOSPITALIST

## 2021-01-01 PROCEDURE — 85027 COMPLETE CBC AUTOMATED: CPT | Performed by: STUDENT IN AN ORGANIZED HEALTH CARE EDUCATION/TRAINING PROGRAM

## 2021-01-01 PROCEDURE — 99233 SBSQ HOSP IP/OBS HIGH 50: CPT | Performed by: HOSPITALIST

## 2021-01-01 PROCEDURE — 99239 HOSP IP/OBS DSCHRG MGMT >30: CPT | Performed by: HOSPITALIST

## 2021-01-01 PROCEDURE — 63600000 HC DRUGS/DETAIL CODE: Mod: JW | Performed by: STUDENT IN AN ORGANIZED HEALTH CARE EDUCATION/TRAINING PROGRAM

## 2021-01-01 PROCEDURE — 87637 SARSCOV2&INF A&B&RSV AMP PRB: CPT | Performed by: NURSE PRACTITIONER

## 2021-01-01 PROCEDURE — 97535 SELF CARE MNGMENT TRAINING: CPT | Mod: GO

## 2021-01-01 PROCEDURE — 84484 ASSAY OF TROPONIN QUANT: CPT | Performed by: NURSE PRACTITIONER

## 2021-01-01 PROCEDURE — 97530 THERAPEUTIC ACTIVITIES: CPT | Mod: GP

## 2021-01-01 PROCEDURE — 97530 THERAPEUTIC ACTIVITIES: CPT | Mod: GP,CQ

## 2021-01-01 PROCEDURE — 81001 URINALYSIS AUTO W/SCOPE: CPT | Performed by: HOSPITALIST

## 2021-01-01 PROCEDURE — 80076 HEPATIC FUNCTION PANEL: CPT | Performed by: HOSPITALIST

## 2021-01-01 PROCEDURE — 71045 X-RAY EXAM CHEST 1 VIEW: CPT

## 2021-01-01 PROCEDURE — 78582 LUNG VENTILAT&PERFUS IMAGING: CPT | Mod: MG

## 2021-01-01 PROCEDURE — 99233 SBSQ HOSP IP/OBS HIGH 50: CPT | Performed by: STUDENT IN AN ORGANIZED HEALTH CARE EDUCATION/TRAINING PROGRAM

## 2021-01-01 PROCEDURE — 83735 ASSAY OF MAGNESIUM: CPT | Performed by: HOSPITALIST

## 2021-01-01 PROCEDURE — 63600000 HC DRUGS/DETAIL CODE: Performed by: STUDENT IN AN ORGANIZED HEALTH CARE EDUCATION/TRAINING PROGRAM

## 2021-01-01 PROCEDURE — 99292 CRITICAL CARE ADDL 30 MIN: CPT

## 2021-01-01 PROCEDURE — U0003 INFECTIOUS AGENT DETECTION BY NUCLEIC ACID (DNA OR RNA); SEVERE ACUTE RESPIRATORY SYNDROME CORONAVIRUS 2 (SARS-COV-2) (CORONAVIRUS DISEASE [COVID-19]), AMPLIFIED PROBE TECHNIQUE, MAKING USE OF HIGH THROUGHPUT TECHNOLOGIES AS DESCRIBED BY CMS-2020-01-R: HCPCS | Performed by: HOSPITALIST

## 2021-01-01 PROCEDURE — 93970 EXTREMITY STUDY: CPT

## 2021-01-01 PROCEDURE — 97110 THERAPEUTIC EXERCISES: CPT | Mod: GP

## 2021-01-01 PROCEDURE — A9540 TC99M MAA: HCPCS | Performed by: STUDENT IN AN ORGANIZED HEALTH CARE EDUCATION/TRAINING PROGRAM

## 2021-01-01 PROCEDURE — 87086 URINE CULTURE/COLONY COUNT: CPT | Performed by: HOSPITALIST

## 2021-01-01 PROCEDURE — A9567 TECHNETIUM TC-99M AEROSOL: HCPCS | Performed by: STUDENT IN AN ORGANIZED HEALTH CARE EDUCATION/TRAINING PROGRAM

## 2021-01-01 PROCEDURE — 80061 LIPID PANEL: CPT | Performed by: STUDENT IN AN ORGANIZED HEALTH CARE EDUCATION/TRAINING PROGRAM

## 2021-01-01 PROCEDURE — 70551 MRI BRAIN STEM W/O DYE: CPT | Mod: MG

## 2021-01-01 PROCEDURE — 97166 OT EVAL MOD COMPLEX 45 MIN: CPT | Mod: GO

## 2021-01-01 PROCEDURE — 36415 COLL VENOUS BLD VENIPUNCTURE: CPT | Performed by: STUDENT IN AN ORGANIZED HEALTH CARE EDUCATION/TRAINING PROGRAM

## 2021-01-01 PROCEDURE — 80048 BASIC METABOLIC PNL TOTAL CA: CPT | Performed by: STUDENT IN AN ORGANIZED HEALTH CARE EDUCATION/TRAINING PROGRAM

## 2021-01-01 PROCEDURE — 93005 ELECTROCARDIOGRAM TRACING: CPT | Performed by: EMERGENCY MEDICINE

## 2021-01-01 PROCEDURE — 82310 ASSAY OF CALCIUM: CPT | Performed by: HOSPITALIST

## 2021-01-01 PROCEDURE — 93005 ELECTROCARDIOGRAM TRACING: CPT

## 2021-01-01 PROCEDURE — 85730 THROMBOPLASTIN TIME PARTIAL: CPT | Performed by: EMERGENCY MEDICINE

## 2021-01-01 PROCEDURE — 85025 COMPLETE CBC W/AUTO DIFF WBC: CPT | Performed by: EMERGENCY MEDICINE

## 2021-01-01 PROCEDURE — 1123F ACP DISCUSS/DSCN MKR DOCD: CPT | Performed by: STUDENT IN AN ORGANIZED HEALTH CARE EDUCATION/TRAINING PROGRAM

## 2021-01-01 PROCEDURE — 72148 MRI LUMBAR SPINE W/O DYE: CPT | Mod: ME

## 2021-01-01 PROCEDURE — 73630 X-RAY EXAM OF FOOT: CPT | Mod: RT

## 2021-01-01 PROCEDURE — 73610 X-RAY EXAM OF ANKLE: CPT | Mod: RT

## 2021-01-01 PROCEDURE — 93306 TTE W/DOPPLER COMPLETE: CPT

## 2021-01-01 RX ORDER — SODIUM BICARBONATE 650 MG/1
650 TABLET ORAL DAILY
Qty: 30 TABLET | Refills: 0
Start: 2021-01-01 | End: 2022-01-01

## 2021-01-01 RX ORDER — AMLODIPINE BESYLATE 10 MG/1
10 TABLET ORAL DAILY
Qty: 15 TABLET | Refills: 0
Start: 2021-01-01 | End: 2022-01-01

## 2021-01-01 RX ORDER — SODIUM CHLORIDE 9 MG/ML
INJECTION, SOLUTION INTRAVENOUS CONTINUOUS
Status: DISCONTINUED | OUTPATIENT
Start: 2021-01-01 | End: 2021-01-01

## 2021-01-01 RX ORDER — PREDNISONE 20 MG/1
40 TABLET ORAL DAILY
Status: DISCONTINUED | OUTPATIENT
Start: 2021-01-01 | End: 2021-01-01

## 2021-01-01 RX ORDER — MIRTAZAPINE 15 MG/1
15 TABLET, FILM COATED ORAL NIGHTLY
COMMUNITY
Start: 2021-01-01 | End: 2022-09-13

## 2021-01-01 RX ORDER — HYDRALAZINE HYDROCHLORIDE 25 MG/1
25 TABLET, FILM COATED ORAL EVERY 8 HOURS
Qty: 90 TABLET | Refills: 0
Start: 2021-01-01 | End: 2022-01-01

## 2021-01-01 RX ORDER — DEXTROSE 40 %
15-30 GEL (GRAM) ORAL AS NEEDED
Status: DISCONTINUED | OUTPATIENT
Start: 2021-01-01 | End: 2021-01-01 | Stop reason: HOSPADM

## 2021-01-01 RX ORDER — OXYBUTYNIN CHLORIDE 5 MG/1
5 TABLET ORAL DAILY
COMMUNITY
Start: 2021-01-01 | End: 2021-01-01 | Stop reason: HOSPADM

## 2021-01-01 RX ORDER — AMLODIPINE BESYLATE 10 MG/1
5 TABLET ORAL DAILY
Status: ON HOLD | COMMUNITY
End: 2021-01-01 | Stop reason: SDUPTHER

## 2021-01-01 RX ORDER — FUROSEMIDE 20 MG/1
20 TABLET ORAL DAILY
Start: 2021-01-01 | End: 2022-01-01

## 2021-01-01 RX ORDER — LORAZEPAM 0.5 MG/1
0.5 TABLET ORAL EVERY 6 HOURS PRN
COMMUNITY
Start: 2021-05-21 | End: 2021-01-01 | Stop reason: HOSPADM

## 2021-01-01 RX ORDER — PREDNISONE 10 MG/1
20 TABLET ORAL DAILY
Qty: 6 TABLET | Refills: 0
Start: 2021-01-01 | End: 2022-01-01

## 2021-01-01 RX ORDER — DIAZEPAM 5 MG/1
5 TABLET ORAL ONCE
Status: COMPLETED | OUTPATIENT
Start: 2021-01-01 | End: 2021-01-01

## 2021-01-01 RX ORDER — FUROSEMIDE 20 MG/1
TABLET ORAL
Status: ON HOLD | COMMUNITY
Start: 2021-01-01 | End: 2021-01-01 | Stop reason: SDUPTHER

## 2021-01-01 RX ORDER — HYDRALAZINE HYDROCHLORIDE 25 MG/1
25 TABLET, FILM COATED ORAL EVERY 8 HOURS
Status: DISCONTINUED | OUTPATIENT
Start: 2021-01-01 | End: 2021-01-01 | Stop reason: HOSPADM

## 2021-01-01 RX ORDER — ACETAMINOPHEN 325 MG/1
650 TABLET ORAL EVERY 8 HOURS
Status: DISCONTINUED | OUTPATIENT
Start: 2021-01-01 | End: 2021-01-01 | Stop reason: HOSPADM

## 2021-01-01 RX ORDER — FUROSEMIDE 10 MG/ML
20 INJECTION INTRAMUSCULAR; INTRAVENOUS ONCE
Status: COMPLETED | OUTPATIENT
Start: 2021-01-01 | End: 2021-01-01

## 2021-01-01 RX ORDER — IBUPROFEN 200 MG
16-32 TABLET ORAL AS NEEDED
Status: DISCONTINUED | OUTPATIENT
Start: 2021-01-01 | End: 2021-01-01 | Stop reason: HOSPADM

## 2021-01-01 RX ORDER — HYDRALAZINE HYDROCHLORIDE 20 MG/ML
10 INJECTION INTRAMUSCULAR; INTRAVENOUS EVERY 6 HOURS PRN
Status: DISCONTINUED | OUTPATIENT
Start: 2021-01-01 | End: 2021-01-01 | Stop reason: HOSPADM

## 2021-01-01 RX ORDER — AMLODIPINE BESYLATE 10 MG/1
10 TABLET ORAL DAILY
Status: DISCONTINUED | OUTPATIENT
Start: 2021-01-01 | End: 2021-01-01 | Stop reason: HOSPADM

## 2021-01-01 RX ORDER — OXYBUTYNIN CHLORIDE 5 MG/1
5 TABLET ORAL NIGHTLY
Status: DISCONTINUED | OUTPATIENT
Start: 2021-01-01 | End: 2021-01-01

## 2021-01-01 RX ORDER — AMLODIPINE BESYLATE 5 MG/1
5 TABLET ORAL DAILY
Status: DISCONTINUED | OUTPATIENT
Start: 2021-01-01 | End: 2021-01-01

## 2021-01-01 RX ORDER — DIAZEPAM 2 MG/1
5 TABLET ORAL ONCE
Status: ACTIVE | OUTPATIENT
Start: 2021-01-01 | End: 2021-01-01

## 2021-01-01 RX ORDER — TAMSULOSIN HYDROCHLORIDE 0.4 MG/1
0.4 CAPSULE ORAL DAILY
Qty: 30 CAPSULE | Refills: 0
Start: 2021-01-01 | End: 2022-01-01

## 2021-01-01 RX ORDER — COLCHICINE 0.6 MG/1
0.6 TABLET ORAL DAILY
Status: DISCONTINUED | OUTPATIENT
Start: 2021-01-01 | End: 2021-01-01 | Stop reason: HOSPADM

## 2021-01-01 RX ORDER — LEVOTHYROXINE SODIUM 75 UG/1
37.5 TABLET ORAL
Status: DISCONTINUED | OUTPATIENT
Start: 2021-01-01 | End: 2021-01-01 | Stop reason: HOSPADM

## 2021-01-01 RX ORDER — FUROSEMIDE 20 MG/1
20 TABLET ORAL DAILY
Status: DISCONTINUED | OUTPATIENT
Start: 2021-01-01 | End: 2021-01-01 | Stop reason: HOSPADM

## 2021-01-01 RX ORDER — ACETAMINOPHEN 325 MG/1
650 TABLET ORAL EVERY 8 HOURS
Qty: 21 TABLET | Refills: 0
Start: 2021-01-01 | End: 2021-01-01

## 2021-01-01 RX ORDER — OXYBUTYNIN CHLORIDE 5 MG/1
5 TABLET ORAL DAILY
Status: DISCONTINUED | OUTPATIENT
Start: 2021-01-01 | End: 2021-01-01 | Stop reason: HOSPADM

## 2021-01-01 RX ORDER — PANTOPRAZOLE SODIUM 40 MG/1
40 TABLET, DELAYED RELEASE ORAL DAILY
Qty: 4 TABLET | Refills: 0
Start: 2021-01-01 | End: 2021-01-01

## 2021-01-01 RX ORDER — HEPARIN SODIUM 10000 [USP'U]/100ML
100-4000 INJECTION, SOLUTION INTRAVENOUS
Status: DISCONTINUED | OUTPATIENT
Start: 2021-01-01 | End: 2021-01-01

## 2021-01-01 RX ORDER — ACETAMINOPHEN 325 MG/1
650 TABLET ORAL EVERY 8 HOURS
Status: DISCONTINUED | OUTPATIENT
Start: 2021-01-01 | End: 2021-01-01

## 2021-01-01 RX ORDER — LORAZEPAM 0.5 MG/1
0.5 TABLET ORAL EVERY 6 HOURS PRN
Status: DISCONTINUED | OUTPATIENT
Start: 2021-01-01 | End: 2021-01-01

## 2021-01-01 RX ORDER — POLYETHYLENE GLYCOL 3350 17 G/17G
17 POWDER, FOR SOLUTION ORAL DAILY
Status: DISCONTINUED | OUTPATIENT
Start: 2021-01-01 | End: 2021-01-01 | Stop reason: HOSPADM

## 2021-01-01 RX ORDER — ATORVASTATIN CALCIUM 40 MG/1
40 TABLET, FILM COATED ORAL
Status: DISCONTINUED | OUTPATIENT
Start: 2021-01-01 | End: 2021-01-01

## 2021-01-01 RX ORDER — LEVOTHYROXINE SODIUM 25 UG/1
37.5 TABLET ORAL DAILY
Qty: 30 TABLET | Refills: 0
Start: 2021-01-01 | End: 2022-01-01

## 2021-01-01 RX ORDER — MIRTAZAPINE 15 MG/1
15 TABLET, FILM COATED ORAL NIGHTLY
Status: DISCONTINUED | OUTPATIENT
Start: 2021-01-01 | End: 2021-01-01 | Stop reason: HOSPADM

## 2021-01-01 RX ORDER — LEVOTHYROXINE SODIUM 25 UG/1
25 TABLET ORAL
Status: DISCONTINUED | OUTPATIENT
Start: 2021-01-01 | End: 2021-01-01

## 2021-01-01 RX ORDER — PANTOPRAZOLE SODIUM 40 MG/1
40 TABLET, DELAYED RELEASE ORAL DAILY
Status: DISCONTINUED | OUTPATIENT
Start: 2021-01-01 | End: 2021-01-01 | Stop reason: HOSPADM

## 2021-01-01 RX ORDER — ASPIRIN 81 MG/1
162 TABLET ORAL DAILY
Status: DISCONTINUED | OUTPATIENT
Start: 2021-01-01 | End: 2021-01-01

## 2021-01-01 RX ORDER — ACETAMINOPHEN 325 MG/1
650 TABLET ORAL EVERY 6 HOURS PRN
Status: DISCONTINUED | OUTPATIENT
Start: 2021-01-01 | End: 2021-01-01

## 2021-01-01 RX ORDER — POLYETHYLENE GLYCOL 3350 17 G/17G
17 POWDER, FOR SOLUTION ORAL DAILY
Qty: 3 PACKET | Refills: 0
Start: 2021-01-01 | End: 2021-01-01

## 2021-01-01 RX ORDER — COLCHICINE 0.6 MG/1
0.6 TABLET ORAL DAILY
Refills: 0
Start: 2021-01-01 | End: 2022-01-01

## 2021-01-01 RX ORDER — DIAZEPAM 2 MG/1
5 TABLET ORAL ONCE AS NEEDED
Status: COMPLETED | OUTPATIENT
Start: 2021-01-01 | End: 2021-01-01

## 2021-01-01 RX ORDER — SODIUM BICARBONATE 650 MG/1
650 TABLET ORAL DAILY
Status: DISCONTINUED | OUTPATIENT
Start: 2021-01-01 | End: 2021-01-01 | Stop reason: HOSPADM

## 2021-01-01 RX ORDER — TAMSULOSIN HYDROCHLORIDE 0.4 MG/1
0.4 CAPSULE ORAL DAILY
Status: DISCONTINUED | OUTPATIENT
Start: 2021-01-01 | End: 2021-01-01 | Stop reason: HOSPADM

## 2021-01-01 RX ORDER — LEVOTHYROXINE SODIUM 25 UG/1
25 TABLET ORAL DAILY
Status: ON HOLD | COMMUNITY
End: 2021-01-01 | Stop reason: SDUPTHER

## 2021-01-01 RX ORDER — DEXTROSE 50 % IN WATER (D50W) INTRAVENOUS SYRINGE
25 AS NEEDED
Status: DISCONTINUED | OUTPATIENT
Start: 2021-01-01 | End: 2021-01-01 | Stop reason: HOSPADM

## 2021-01-01 RX ADMIN — LEVOTHYROXINE SODIUM 37.5 MCG: 0.07 TABLET ORAL at 06:03

## 2021-01-01 RX ADMIN — AMLODIPINE BESYLATE 10 MG: 10 TABLET ORAL at 08:39

## 2021-01-01 RX ADMIN — ACETAMINOPHEN 650 MG: 325 TABLET, FILM COATED ORAL at 16:40

## 2021-01-01 RX ADMIN — ACETAMINOPHEN 650 MG: 325 TABLET, FILM COATED ORAL at 21:23

## 2021-01-01 RX ADMIN — HYDRALAZINE HYDROCHLORIDE 25 MG: 25 TABLET ORAL at 21:27

## 2021-01-01 RX ADMIN — HEPARIN SODIUM 1600 UNITS/HR: 10000 INJECTION, SOLUTION INTRAVENOUS at 17:53

## 2021-01-01 RX ADMIN — METHYLPREDNISOLONE SODIUM SUCCINATE 40 MG: 40 INJECTION, POWDER, FOR SOLUTION INTRAMUSCULAR; INTRAVENOUS at 08:49

## 2021-01-01 RX ADMIN — LEVOTHYROXINE SODIUM 25 MCG: 0.03 TABLET ORAL at 05:35

## 2021-01-01 RX ADMIN — TAMSULOSIN HYDROCHLORIDE 0.4 MG: 0.4 CAPSULE ORAL at 08:22

## 2021-01-01 RX ADMIN — POLYETHYLENE GLYCOL 3350 17 G: 17 POWDER, FOR SOLUTION ORAL at 09:53

## 2021-01-01 RX ADMIN — DIAZEPAM 5 MG: 2 TABLET ORAL at 10:58

## 2021-01-01 RX ADMIN — COLCHICINE 0.6 MG: 0.6 TABLET, FILM COATED ORAL at 10:01

## 2021-01-01 RX ADMIN — PREDNISONE 40 MG: 20 TABLET ORAL at 09:52

## 2021-01-01 RX ADMIN — ACETAMINOPHEN 650 MG: 325 TABLET, FILM COATED ORAL at 15:45

## 2021-01-01 RX ADMIN — HYDRALAZINE HYDROCHLORIDE 10 MG: 20 INJECTION INTRAMUSCULAR; INTRAVENOUS at 18:04

## 2021-01-01 RX ADMIN — LEVOTHYROXINE SODIUM 37.5 MCG: 0.07 TABLET ORAL at 05:48

## 2021-01-01 RX ADMIN — AMLODIPINE BESYLATE 10 MG: 10 TABLET ORAL at 10:01

## 2021-01-01 RX ADMIN — MIRTAZAPINE 15 MG: 15 TABLET, FILM COATED ORAL at 20:18

## 2021-01-01 RX ADMIN — LEVOTHYROXINE SODIUM 37.5 MCG: 0.07 TABLET ORAL at 06:43

## 2021-01-01 RX ADMIN — ACETAMINOPHEN 650 MG: 325 TABLET, FILM COATED ORAL at 21:12

## 2021-01-01 RX ADMIN — COLCHICINE 0.6 MG: 0.6 TABLET, FILM COATED ORAL at 09:52

## 2021-01-01 RX ADMIN — PANTOPRAZOLE SODIUM 40 MG: 40 TABLET, DELAYED RELEASE ORAL at 09:52

## 2021-01-01 RX ADMIN — ACETAMINOPHEN 650 MG: 325 TABLET, FILM COATED ORAL at 06:43

## 2021-01-01 RX ADMIN — AMLODIPINE BESYLATE 10 MG: 10 TABLET ORAL at 08:22

## 2021-01-01 RX ADMIN — LEVOTHYROXINE SODIUM 25 MCG: 0.03 TABLET ORAL at 04:55

## 2021-01-01 RX ADMIN — HYDRALAZINE HYDROCHLORIDE 25 MG: 25 TABLET ORAL at 17:15

## 2021-01-01 RX ADMIN — APIXABAN 2.5 MG: 2.5 TABLET, FILM COATED ORAL at 08:22

## 2021-01-01 RX ADMIN — ACETAMINOPHEN 650 MG: 325 TABLET, FILM COATED ORAL at 09:59

## 2021-01-01 RX ADMIN — SODIUM CHLORIDE: 9 INJECTION, SOLUTION INTRAVENOUS at 08:40

## 2021-01-01 RX ADMIN — FUROSEMIDE 20 MG: 20 TABLET ORAL at 09:59

## 2021-01-01 RX ADMIN — HYDRALAZINE HYDROCHLORIDE 25 MG: 25 TABLET ORAL at 06:26

## 2021-01-01 RX ADMIN — MIRTAZAPINE 15 MG: 15 TABLET, FILM COATED ORAL at 21:24

## 2021-01-01 RX ADMIN — SODIUM BICARBONATE 650 MG: 650 TABLET ORAL at 12:12

## 2021-01-01 RX ADMIN — APIXABAN 2.5 MG: 2.5 TABLET, FILM COATED ORAL at 20:23

## 2021-01-01 RX ADMIN — ASPIRIN 162 MG: 81 TABLET, COATED ORAL at 09:59

## 2021-01-01 RX ADMIN — ACETAMINOPHEN 650 MG: 325 TABLET, FILM COATED ORAL at 06:03

## 2021-01-01 RX ADMIN — HYDRALAZINE HYDROCHLORIDE 25 MG: 25 TABLET ORAL at 21:12

## 2021-01-01 RX ADMIN — SODIUM CHLORIDE: 9 INJECTION, SOLUTION INTRAVENOUS at 21:14

## 2021-01-01 RX ADMIN — PANTOPRAZOLE SODIUM 40 MG: 40 TABLET, DELAYED RELEASE ORAL at 10:01

## 2021-01-01 RX ADMIN — MIRTAZAPINE 15 MG: 15 TABLET, FILM COATED ORAL at 21:15

## 2021-01-01 RX ADMIN — HYDRALAZINE HYDROCHLORIDE 25 MG: 25 TABLET ORAL at 14:53

## 2021-01-01 RX ADMIN — LEVOTHYROXINE SODIUM 37.5 MCG: 0.07 TABLET ORAL at 06:33

## 2021-01-01 RX ADMIN — OXYBUTYNIN CHLORIDE 5 MG: 5 TABLET ORAL at 10:00

## 2021-01-01 RX ADMIN — HYDRALAZINE HYDROCHLORIDE 25 MG: 25 TABLET ORAL at 15:15

## 2021-01-01 RX ADMIN — HYDRALAZINE HYDROCHLORIDE 25 MG: 25 TABLET ORAL at 14:01

## 2021-01-01 RX ADMIN — COLCHICINE 0.6 MG: 0.6 TABLET, FILM COATED ORAL at 08:22

## 2021-01-01 RX ADMIN — ASPIRIN 162 MG: 81 TABLET, COATED ORAL at 10:04

## 2021-01-01 RX ADMIN — HYDRALAZINE HYDROCHLORIDE 25 MG: 25 TABLET ORAL at 20:23

## 2021-01-01 RX ADMIN — APIXABAN 2.5 MG: 2.5 TABLET, FILM COATED ORAL at 08:40

## 2021-01-01 RX ADMIN — APIXABAN 2.5 MG: 2.5 TABLET, FILM COATED ORAL at 21:24

## 2021-01-01 RX ADMIN — OXYBUTYNIN CHLORIDE 5 MG: 5 TABLET ORAL at 10:04

## 2021-01-01 RX ADMIN — SODIUM CHLORIDE: 9 INJECTION, SOLUTION INTRAVENOUS at 22:32

## 2021-01-01 RX ADMIN — AMLODIPINE BESYLATE 10 MG: 10 TABLET ORAL at 08:36

## 2021-01-01 RX ADMIN — DIAZEPAM 5 MG: 5 TABLET ORAL at 10:14

## 2021-01-01 RX ADMIN — HYDRALAZINE HYDROCHLORIDE 25 MG: 25 TABLET ORAL at 05:48

## 2021-01-01 RX ADMIN — AMLODIPINE BESYLATE 10 MG: 10 TABLET ORAL at 08:49

## 2021-01-01 RX ADMIN — MIRTAZAPINE 15 MG: 15 TABLET, FILM COATED ORAL at 20:23

## 2021-01-01 RX ADMIN — AMLODIPINE BESYLATE 10 MG: 10 TABLET ORAL at 09:52

## 2021-01-01 RX ADMIN — APIXABAN 2.5 MG: 2.5 TABLET, FILM COATED ORAL at 08:37

## 2021-01-01 RX ADMIN — HYDRALAZINE HYDROCHLORIDE 25 MG: 25 TABLET ORAL at 06:05

## 2021-01-01 RX ADMIN — TAMSULOSIN HYDROCHLORIDE 0.4 MG: 0.4 CAPSULE ORAL at 09:52

## 2021-01-01 RX ADMIN — HYDRALAZINE HYDROCHLORIDE 25 MG: 25 TABLET ORAL at 21:24

## 2021-01-01 RX ADMIN — AMLODIPINE BESYLATE 10 MG: 10 TABLET ORAL at 10:04

## 2021-01-01 RX ADMIN — ACETAMINOPHEN 650 MG: 325 TABLET, FILM COATED ORAL at 17:13

## 2021-01-01 RX ADMIN — ACETAMINOPHEN 650 MG: 325 TABLET, FILM COATED ORAL at 15:15

## 2021-01-01 RX ADMIN — APIXABAN 2.5 MG: 2.5 TABLET, FILM COATED ORAL at 10:02

## 2021-01-01 RX ADMIN — PANTOPRAZOLE SODIUM 40 MG: 40 TABLET, DELAYED RELEASE ORAL at 08:22

## 2021-01-01 RX ADMIN — HYDRALAZINE HYDROCHLORIDE 25 MG: 25 TABLET ORAL at 06:43

## 2021-01-01 RX ADMIN — HEPARIN SODIUM 1350 UNITS/HR: 10000 INJECTION, SOLUTION INTRAVENOUS at 10:43

## 2021-01-01 RX ADMIN — MIRTAZAPINE 15 MG: 15 TABLET, FILM COATED ORAL at 21:07

## 2021-01-01 RX ADMIN — PREDNISONE 40 MG: 20 TABLET ORAL at 10:01

## 2021-01-01 RX ADMIN — ACETAMINOPHEN 650 MG: 325 TABLET, FILM COATED ORAL at 15:17

## 2021-01-01 RX ADMIN — ACETAMINOPHEN 650 MG: 325 TABLET, FILM COATED ORAL at 21:27

## 2021-01-01 RX ADMIN — SODIUM CHLORIDE: 9 INJECTION, SOLUTION INTRAVENOUS at 10:07

## 2021-01-01 RX ADMIN — MIRTAZAPINE 15 MG: 15 TABLET, FILM COATED ORAL at 21:27

## 2021-01-01 RX ADMIN — PANTOPRAZOLE SODIUM 40 MG: 40 TABLET, DELAYED RELEASE ORAL at 08:36

## 2021-01-01 RX ADMIN — APIXABAN 2.5 MG: 2.5 TABLET, FILM COATED ORAL at 08:49

## 2021-01-01 RX ADMIN — KIT FOR THE PREPARATION OF TECHNETIUM TC 99M PENTETATE 27.5 MILLICURIE: 20 INJECTION, POWDER, LYOPHILIZED, FOR SOLUTION INTRAVENOUS; RESPIRATORY (INHALATION) at 14:00

## 2021-01-01 RX ADMIN — ASPIRIN 162 MG: 81 TABLET, COATED ORAL at 17:51

## 2021-01-01 RX ADMIN — HYDRALAZINE HYDROCHLORIDE 25 MG: 25 TABLET ORAL at 06:03

## 2021-01-01 RX ADMIN — ATORVASTATIN CALCIUM 40 MG: 40 TABLET, FILM COATED ORAL at 20:47

## 2021-01-01 RX ADMIN — APIXABAN 2.5 MG: 2.5 TABLET, FILM COATED ORAL at 20:18

## 2021-01-01 RX ADMIN — ACETAMINOPHEN 650 MG: 325 TABLET, FILM COATED ORAL at 06:05

## 2021-01-01 RX ADMIN — LEVOTHYROXINE SODIUM 37.5 MCG: 0.07 TABLET ORAL at 06:05

## 2021-01-01 RX ADMIN — AMLODIPINE BESYLATE 10 MG: 10 TABLET ORAL at 09:59

## 2021-01-01 RX ADMIN — OXYBUTYNIN CHLORIDE 5 MG: 5 TABLET ORAL at 08:39

## 2021-01-01 RX ADMIN — FUROSEMIDE 20 MG: 20 TABLET ORAL at 10:04

## 2021-01-01 RX ADMIN — LEVOTHYROXINE SODIUM 37.5 MCG: 0.07 TABLET ORAL at 06:26

## 2021-01-01 RX ADMIN — HYDRALAZINE HYDROCHLORIDE 25 MG: 25 TABLET ORAL at 15:17

## 2021-01-01 RX ADMIN — HYDRALAZINE HYDROCHLORIDE 25 MG: 25 TABLET ORAL at 14:17

## 2021-01-01 RX ADMIN — ACETAMINOPHEN 650 MG: 325 TABLET, FILM COATED ORAL at 06:26

## 2021-01-01 RX ADMIN — MIRTAZAPINE 15 MG: 15 TABLET, FILM COATED ORAL at 21:12

## 2021-01-01 RX ADMIN — METOPROLOL SUCCINATE 12.5 MG: 25 TABLET, EXTENDED RELEASE ORAL at 09:58

## 2021-01-01 RX ADMIN — APIXABAN 2.5 MG: 2.5 TABLET, FILM COATED ORAL at 21:27

## 2021-01-01 RX ADMIN — HYDRALAZINE HYDROCHLORIDE 25 MG: 25 TABLET ORAL at 20:18

## 2021-01-01 RX ADMIN — SODIUM CHLORIDE: 9 INJECTION, SOLUTION INTRAVENOUS at 21:24

## 2021-01-01 RX ADMIN — SODIUM CHLORIDE: 9 INJECTION, SOLUTION INTRAVENOUS at 11:20

## 2021-01-01 RX ADMIN — PREDNISONE 30 MG: 20 TABLET ORAL at 08:37

## 2021-01-01 RX ADMIN — COLCHICINE 0.6 MG: 0.6 TABLET, FILM COATED ORAL at 08:49

## 2021-01-01 RX ADMIN — FUROSEMIDE 20 MG: 10 INJECTION, SOLUTION INTRAMUSCULAR; INTRAVENOUS at 20:43

## 2021-01-01 RX ADMIN — ATORVASTATIN CALCIUM 40 MG: 40 TABLET, FILM COATED ORAL at 18:33

## 2021-01-01 RX ADMIN — APIXABAN 2.5 MG: 2.5 TABLET, FILM COATED ORAL at 21:12

## 2021-01-01 RX ADMIN — KIT FOR THE PREPARATION OF TECHNETIUM TC 99M ALBUMIN AGGREGATED 4.4 MILLICURIE: 2.5 INJECTION, POWDER, FOR SOLUTION INTRAVENOUS at 14:14

## 2021-01-01 RX ADMIN — ACETAMINOPHEN 650 MG: 325 TABLET, FILM COATED ORAL at 14:17

## 2021-01-01 RX ADMIN — HYDRALAZINE HYDROCHLORIDE 25 MG: 25 TABLET ORAL at 15:45

## 2021-01-01 RX ADMIN — HYDRALAZINE HYDROCHLORIDE 25 MG: 25 TABLET ORAL at 06:33

## 2021-01-01 RX ADMIN — POLYETHYLENE GLYCOL 3350 17 G: 17 POWDER, FOR SOLUTION ORAL at 16:40

## 2021-01-01 RX ADMIN — PREDNISONE 30 MG: 20 TABLET ORAL at 08:22

## 2021-01-01 RX ADMIN — APIXABAN 2.5 MG: 2.5 TABLET, FILM COATED ORAL at 09:52

## 2021-01-01 RX ADMIN — SODIUM CHLORIDE: 9 INJECTION, SOLUTION INTRAVENOUS at 09:52

## 2021-01-01 RX ADMIN — COLCHICINE 0.6 MG: 0.6 TABLET, FILM COATED ORAL at 08:36

## 2021-01-01 RX ADMIN — TAMSULOSIN HYDROCHLORIDE 0.4 MG: 0.4 CAPSULE ORAL at 08:37

## 2021-01-01 RX ADMIN — METOPROLOL SUCCINATE 12.5 MG: 25 TABLET, EXTENDED RELEASE ORAL at 18:33

## 2021-01-01 ASSESSMENT — COGNITIVE AND FUNCTIONAL STATUS - GENERAL
STANDING UP FROM CHAIR USING ARMS: 2 - A LOT
CLIMB 3 TO 5 STEPS WITH RAILING: 1 - TOTAL
WALKING IN HOSPITAL ROOM: 2 - A LOT
AFFECT: ANXIOUS;CONFUSED;FLAT/BLUNTED AFFECT
HELP NEEDED FOR PERSONAL GROOMING: 3 - A LITTLE
MOVING TO AND FROM BED TO CHAIR: 2 - A LOT
DRESSING REGULAR LOWER BODY CLOTHING: 4 - NONE
MOVING TO AND FROM BED TO CHAIR: 2 - A LOT
TOILETING: 4 - NONE
MOVING TO AND FROM BED TO CHAIR: 2 - A LOT
STANDING UP FROM CHAIR USING ARMS: 4 - NONE
CLIMB 3 TO 5 STEPS WITH RAILING: 4 - NONE
EATING MEALS: 3 - A LITTLE
DRESSING REGULAR LOWER BODY CLOTHING: 1 - TOTAL
STANDING UP FROM CHAIR USING ARMS: 2 - A LOT
AFFECT: WFL
EATING MEALS: 4 - NONE
WALKING IN HOSPITAL ROOM: 1 - TOTAL
AFFECT: WFL;FLAT/BLUNTED AFFECT
DRESSING REGULAR LOWER BODY CLOTHING: 1 - TOTAL
WALKING IN HOSPITAL ROOM: 2 - A LOT
HELP NEEDED FOR BATHING: 2 - A LOT
WALKING IN HOSPITAL ROOM: 1 - TOTAL
HELP NEEDED FOR PERSONAL GROOMING: 3 - A LITTLE
DRESSING REGULAR UPPER BODY CLOTHING: 2 - A LOT
EATING MEALS: 3 - A LITTLE
MOVING TO AND FROM BED TO CHAIR: 4 - NONE
EATING MEALS: 3 - A LITTLE
HELP NEEDED FOR BATHING: 2 - A LOT
MOVING TO AND FROM BED TO CHAIR: 2 - A LOT
CLIMB 3 TO 5 STEPS WITH RAILING: 1 - TOTAL
HELP NEEDED FOR BATHING: 2 - A LOT
WALKING IN HOSPITAL ROOM: 4 - NONE
TOILETING: 1 - TOTAL
AFFECT: WFL;FLAT/BLUNTED AFFECT
STANDING UP FROM CHAIR USING ARMS: 2 - A LOT
AFFECT: FLAT/BLUNTED AFFECT
AFFECT: WFL
HELP NEEDED FOR BATHING: 4 - NONE
STANDING UP FROM CHAIR USING ARMS: 1 - TOTAL
TOILETING: 1 - TOTAL
CLIMB 3 TO 5 STEPS WITH RAILING: 2 - A LOT
STANDING UP FROM CHAIR USING ARMS: 2 - A LOT
WALKING IN HOSPITAL ROOM: 1 - TOTAL
CLIMB 3 TO 5 STEPS WITH RAILING: 1 - TOTAL
TOILETING: 2 - A LOT
DRESSING REGULAR UPPER BODY CLOTHING: 3 - A LITTLE
DRESSING REGULAR UPPER BODY CLOTHING: 4 - NONE
CLIMB 3 TO 5 STEPS WITH RAILING: 2 - A LOT
DRESSING REGULAR LOWER BODY CLOTHING: 2 - A LOT
DRESSING REGULAR UPPER BODY CLOTHING: 2 - A LOT
MOVING TO AND FROM BED TO CHAIR: 1 - TOTAL
HELP NEEDED FOR PERSONAL GROOMING: 4 - NONE
HELP NEEDED FOR PERSONAL GROOMING: 3 - A LITTLE

## 2021-01-01 ASSESSMENT — ENCOUNTER SYMPTOMS
BRUISES/BLEEDS EASILY: 0
FATIGUE: 1
COUGH: 0
CONSTIPATION: 0
DYSURIA: 0
ABDOMINAL PAIN: 0
SHORTNESS OF BREATH: 1
NAUSEA: 0
VOMITING: 0
CONFUSION: 0
DIFFICULTY URINATING: 0
HEADACHES: 0
FEVER: 0
DIARRHEA: 0

## 2021-01-01 ASSESSMENT — PATIENT HEALTH QUESTIONNAIRE - PHQ9: SUM OF ALL RESPONSES TO PHQ9 QUESTIONS 1 & 2: 0

## 2021-04-13 DIAGNOSIS — Z23 ENCOUNTER FOR IMMUNIZATION: ICD-10-CM

## 2021-11-27 PROBLEM — I10 HYPERTENSION: Status: ACTIVE | Noted: 2021-01-01

## 2021-11-27 PROBLEM — I48.0 PAROXYSMAL A-FIB (CMS/HCC): Status: ACTIVE | Noted: 2021-01-01

## 2021-11-27 PROBLEM — R29.898 LEFT LEG WEAKNESS: Status: ACTIVE | Noted: 2021-01-01

## 2021-11-27 PROBLEM — N18.4 CKD (CHRONIC KIDNEY DISEASE) STAGE 4, GFR 15-29 ML/MIN (CMS/HCC): Status: ACTIVE | Noted: 2021-01-01

## 2021-11-27 PROBLEM — R06.02 SOB (SHORTNESS OF BREATH) ON EXERTION: Status: ACTIVE | Noted: 2021-01-01

## 2021-11-27 PROBLEM — C64.9 RENAL CANCER (CMS/HCC): Status: ACTIVE | Noted: 2021-01-01

## 2021-11-27 NOTE — ED PROVIDER NOTES
HPI     81-year-old male presents to the emergency department for complaints of shortness of breath and progressing fatigue.  He does take Eliquis for history of atrial fibrillation about 1 year ago and has been on it for about 9 months.  He states over last 2 days he forgot his prescription.  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  On chemotherapy.  Chronic leg swelling.  No further complaints.    Past Medical History:   Diagnosis Date   • Gout    • Hypertension    • Renal cancer, right (CMS/HCC) (HCC)        Past Surgical History:   Procedure Laterality Date   • NEPHRECTOMY Right    • PROSTATECTOMY         Allergies   Allergen Reactions   • No Known Drug Allergies        Social History     Tobacco Use   Smoking Status Former Smoker   Smokeless Tobacco Never Used       Social History     Substance and Sexual Activity   Alcohol Use No       Social History     Substance and Sexual Activity   Drug Use No       No LMP for male patient.    Review of Systems   Constitutional: Positive for fatigue. Negative for fever.   HENT: Negative for congestion.    Respiratory: Positive for shortness of breath. Negative for cough.    Cardiovascular: Positive for leg swelling. Negative for chest pain.   Gastrointestinal: Negative for abdominal pain, constipation, diarrhea, nausea and vomiting.   Genitourinary: Negative for difficulty urinating and dysuria.   Musculoskeletal: Negative for gait problem.   Skin: Negative for pallor.   Allergic/Immunologic: Negative for immunocompromised state.   Neurological: Negative for headaches.   Hematological: Does not bruise/bleed easily.   Psychiatric/Behavioral: Negative for confusion.       Vitals:    11/27/21 1301 11/27/21 1430 11/27/21 1517 11/27/21 1548   BP: (!) 182/88 (!) 196/95  (!) 174/83   BP Location: Right upper arm Left upper arm     Patient  "Position: Sitting Sitting     Pulse: (!) 56 (!) 54 66 (!) 52   Resp: (!) 24 (!) 24 (!) 24 17   Temp: 36.3 °C (97.4 °F)      TempSrc: Temporal      SpO2: 99% 98% 99% 98%   Weight: 89.8 kg (198 lb)      Height: 1.803 m (5' 11\")          Physical Exam  Vitals and nursing note reviewed.   Constitutional:       General: He is not in acute distress.     Appearance: He is not toxic-appearing.   HENT:      Head: Normocephalic.      Mouth/Throat:      Pharynx: Oropharynx is clear.   Cardiovascular:      Rate and Rhythm: Regular rhythm. Bradycardia present.   Pulmonary:      Effort: Pulmonary effort is normal.      Breath sounds: Normal breath sounds.   Musculoskeletal:         General: Normal range of motion.      Cervical back: Neck supple.      Comments: Pitting edema in the bilateral lower extremities slightly worse in the right lower extremity.  There is no erythema or tenderness to palpation of the calves.   Skin:     General: Skin is warm and dry.      Capillary Refill: Capillary refill takes less than 2 seconds.      Coloration: Skin is not pale.   Neurological:      Mental Status: He is alert and oriented to person, place, and time.      GCS: GCS eye subscore is 4. GCS verbal subscore is 5. GCS motor subscore is 6.      Cranial Nerves: Cranial nerves are intact.   Psychiatric:         Mood and Affect: Mood normal.         Behavior: Behavior normal.         Procedures    ED Course as of 11/27/21 1649   Sat Nov 27, 2021   1335 Impression: Shortness of breath and fatigue.  History of A. fib and on Eliquis.  Forgot Eliquis x2 days as he lost his prescription.  Right nephrectomy.  History of cancer.  Leg swelling.    Plan: CBC, CMP, BNP, troponin, chest x-ray, bilateral venous duplex, EKG.  Will await kidney function and consider CT PE study.   [AO]   1337 Troponin I: <0.02 [AO]   1450 BNP(!): 284  May be abnormal due to renal disease. [AO]   1450 Creatinine(!): 2.4  2.25 on 11/16.  Consistent with prior. [AO]   1450 " eGFR(!): 26.0 [AO]   1450 CXR:   IMPRESSION: No acute pulmonary abnormality. [AO]   1456 Daughter now reports patient left leg keeps giving out/weak and having trouble walking. Exertional SOB.  Head CT ordered.  Left leg drift noted on exam. [AO]   1553 B/l venous duplex:   IMPRESSION:  No evidence for bilateral deep venous thrombosis in the visualized veins. [AO]   1620 Head CT:   IMPRESSION:  No acute intracranial process. [AO]   1623 Patient and family updated on all results.  Patient is a full code.  In agreement with admission.  [facility] paged for admission. [AO]   1626 Dr. Espinoza spoke to [facility].  Will admit to telemetry. [AO]      ED Course User Index  [AO] Ashlee Murrieta CRNP         Clinical Impressions as of 11/27/21 1649   Shortness of breath   Left leg weakness       Final diagnoses:   [R06.02] Shortness of breath   [R29.898] Left leg weakness       Labs Reviewed   CBC AND DIFF - Abnormal       Result Value    WBC 5.42      RBC 3.53 (*)     Hemoglobin 10.8 (*)     Hematocrit 34.5 (*)     MCV 97.7      MCH 30.6      MCHC 31.3 (*)     RDW 15.9 (*)     Platelets 111 (*)     MPV 8.7 (*)     Differential Type Auto      nRBC 0.0      Immature Granulocytes 0.6      Neutrophils 85.2      Lymphocytes 7.9      Monocytes 4.4      Eosinophils 1.5      Basophils 0.4      Immature Granulocytes, Absolute 0.03      Neutrophils, Absolute 4.62      Lymphocytes, Absolute 0.43 (*)     Monocytes, Absolute 0.24 (*)     Eosinophils, Absolute 0.08      Basophils, Absolute 0.02     COMPREHENSIVE METABOLIC PANEL - Abnormal    Sodium 136      Potassium 4.6      Chloride 107      CO2 20 (*)     BUN 51 (*)     Creatinine 2.4 (*)     Glucose 93      Calcium 8.2 (*)     AST (SGOT) 27      ALT (SGPT) 18      Alkaline Phosphatase 135 (*)     Total Protein 6.6      Albumin 3.0 (*)     Bilirubin, Total 0.6      eGFR 26.0 (*)     Anion Gap 9     B-TYPE NATRIURETIC PEPTIDE - Abnormal     (*)    SARS-COV-2 (COVID-19)/ FLU A/B, AND RSV, PCR  - Normal    SARS-CoV-2 (COVID-19) Negative      Influenza A Negative      Influenza B Negative      Respiratory Syncytial Virus Negative     TROPONIN I - Normal    Troponin I <0.02     SARS-COV-2 (COVID 19), PCR    Narrative:     The following orders were created for panel order SARS-CoV-2 (COVID-19), PCR Nasopharynx.  Procedure                               Abnormality         Status                     ---------                               -----------         ------                     SARS-COV-2 (COVID-19)/ F...[[id number]]  Normal              Final result                 Please view results for these tests on the individual orders.   RAINBOW DRAW PANEL    Narrative:     The following orders were created for panel order [place] Draw Panel.  Procedure                               Abnormality         Status                     ---------                               -----------         ------                     RAINBOW RED[[id number]]                                      In process                 RAINBOW LT BLUE[[id number]]                                  In process                 RAINBOW GOLD[[id number]]                                     In process                   Please view results for these tests on the individual orders.   D-DIMER   RAINBOW RED   RAINBOW LT BLUE   RAINBOW GOLD       CT HEAD WITHOUT IV CONTRAST   Final Result   IMPRESSION:   No acute intracranial process.         US venous leg bilateral   Final Result   IMPRESSION:   No evidence for bilateral deep venous thrombosis in the visualized veins.      X-RAY CHEST 1 VIEW   Final Result   IMPRESSION: No acute pulmonary abnormality.         ECG 12 lead   ED Interpretation   Interpreted with ED attending.   Rhythm: sinus bradycardia.   Rate: 54 bpm.  P wave interval: normal interval.  QRS: normal QRS.  QTc: normal.  ST Segments: no obvious ST elevation.    Rhythm Strip: sinus bradycardia.     O2 sat: normal. 99% on RA.             Transthoracic  echo (TTE) complete    (Results Pending)          Ashlee Murrieta CRNP  11/27/21 3555

## 2021-11-27 NOTE — ASSESSMENT & PLAN NOTE
Saturating well on RA  VQ scan: Low probability for pulmonary embolism  Chest x-ray: No acute pulmonary disease    Monitor

## 2021-11-27 NOTE — ASSESSMENT & PLAN NOTE
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

## 2021-11-27 NOTE — ASSESSMENT & PLAN NOTE
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 2. Edema in the paraspinal muscles, most pronounced from L3 through S1.    Discussed with Dr. Verduzco November 30: Left leg weakness is likely secondary to lumbar radiculopathy.  We will get neurosurgery consult.  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  + mild to moderate L sided stenosis at L1-2 and L2-3 that is likely chronic given he is fused across those segments (arthritis will not progress once fused). + painless leg symptoms that I do no believe are due to this chronic stenosis. No NSGY intervention.  Delayed EMG (3-4 weeks of weakness before EMG studies tend to show positive findings)    PT noted  Will need SNF

## 2021-11-27 NOTE — H&P
Hospital Medicine Service -  History & Physical        CHIEF COMPLAINT   Left leg weakness     HISTORY OF PRESENT ILLNESS      Marshall Garrison is a 81 y.o. male with a past medical history of renal cancer status post nephrectomy and now recurrence, hypertension, paroxysmal A. fib on Eliquis, CKD who presents with chief complaint of left lower extremity weakness since 3 days.  History was obtained from patient and his daughter.  Patient is a very pleasant and as per daughter quite active, plays golf, lives alone on the second floor of his 5000 square feet home.  When they saw him at [employer] dinner the family noticed that he was walking slowly and moving his left lower leg abnormally.  Patient reports that he felt this weakness since 2-3 days only.  He did not seek medical attention.  He is generally good with his medication but however missed his Eliquis since 3-4 days since he ran out of refill.  As per patient and daughter he has been having exertional shortness of breath since 3 days.  Increased difficulty in walking has only started since 3 days.     Review of system- Negative for fever, cough, shortness of breath, chest pain, palpitations, dizziness, abdominal pain, nausea, vomiting, constipation, diarrhea, increased urination, burning while urination, leg swelling, loss of consciousness.    Reports having good appetite.    Being admitted in the telemetry unit to rule out CVA and rule out PE.  Started on heparin drip.  Patient needs MRI brain and MRA head and neck along with VQ scan.     Advance care planning discussed with patient who would want his CODE STATUS to remain full code and if incapacitated and unable to make decision his surrogate decision maker would be his daughter Ivette Enamorado-phone number in system.  PAST MEDICAL AND SURGICAL HISTORY      Past Medical History:   Diagnosis Date   • Gout    • Hypertension    • Renal cancer, right (CMS/HCC) (HCC)        Past Surgical History:   Procedure  Laterality Date   • NEPHRECTOMY Right    • PROSTATECTOMY         PCP: Sloane Arredondo MD    MEDICATIONS      Prior to Admission medications    Medication Sig Start Date End Date Taking? Authorizing Provider   apixaban 2.5 mg tablet Take 2.5 mg by mouth 2 times daily. 5/14/21  Yes Nurys Blood MD   LORazepam 0.5 mg tablet Take 0.5 mg by mouth every 6 hours as needed. 5/21/21  Yes Nurys Blood MD   mirtazapine 15 mg tablet Take 15 mg by mouth nightly. 9/13/21 9/13/22 Yes Nurys Blood MD   oxybutynin 5 mg tablet Take 5 mg by mouth daily. 10/1/21  Yes Nurys Blood MD   allopurinol (ZYLOPRIM) 100 mg tablet  1/3/18   Nurys Blood MD   amLODIPine 10 mg tablet Take 5 mg by mouth daily.    Nurys Blood MD   cabozantinib (CABOMETYX) 40 mg tablet Take by mouth once daily.    Nurys Blood MD   colchicine (COLCRYS) 0.6 mg tablet  2/6/18   Nurys Blood MD   furosemide 20 mg tablet  11/22/21   Nurys Blood MD   levothyroxine 25 mcg tablet Take 25 mcg by mouth daily.    Nurys Blood MD   lisinopril (PRINIVIL) 5 mg tablet Take 5 mg by mouth daily.    Nurys Blood MD       ALLERGIES      No known drug allergies    FAMILY HISTORY      No family history on file.    SOCIAL HISTORY      Social History     Socioeconomic History   • Marital status: Single     Spouse name: Not on file   • Number of children: Not on file   • Years of education: Not on file   • Highest education level: Not on file   Occupational History   • Not on file   Tobacco Use   • Smoking status: Former Smoker   • Smokeless tobacco: Never Used   Substance and Sexual Activity   • Alcohol use: No   • Drug use: No   • Sexual activity: Defer   Other Topics Concern   • Not on file   Social History Narrative   • Not on file     Social Determinants of Health     Financial Resource Strain:    • Difficulty of Paying Living Expenses: Not on file   Food Insecurity: No Food  Insecurity   • Worried About Running Out of Food in the Last Year: Never true   • Ran Out of Food in the Last Year: Never true   Transportation Needs:    • Lack of Transportation (Medical): Not on file   • Lack of Transportation (Non-Medical): Not on file   Physical Activity:    • Days of Exercise per Week: Not on file   • Minutes of Exercise per Session: Not on file   Stress:    • Feeling of Stress : Not on file   Social Connections:    • Frequency of Communication with Friends and Family: Not on file   • Frequency of Social Gatherings with Friends and Family: Not on file   • Attends Church Services: Not on file   • Active Member of Clubs or Organizations: Not on file   • Attends Club or Organization Meetings: Not on file   • Marital Status: Not on file   Intimate Partner Violence:    • Fear of Current or Ex-Partner: Not on file   • Emotionally Abused: Not on file   • Physically Abused: Not on file   • Sexually Abused: Not on file   Housing Stability:    • Unable to Pay for Housing in the Last Year: Not on file   • Number of Places Lived in the Last Year: Not on file   • Unstable Housing in the Last Year: Not on file       REVIEW OF SYSTEMS      All other systems reviewed and negative except as noted in HPI    PHYSICAL EXAMINATION      Temp:  [36.3 °C (97.4 °F)-36.4 °C (97.6 °F)] 36.4 °C (97.6 °F)  Heart Rate:  [52-66] 59  Resp:  [14-24] 20  BP: (174-206)/(83-95) 206/94  Body mass index is 27.62 kg/m².    Physical Exam  Vitals and nursing note reviewed.   Constitutional:       Appearance: Normal appearance.   HENT:      Head: Normocephalic and atraumatic.      Nose: Nose normal.      Mouth/Throat:      Mouth: Mucous membranes are moist.   Eyes:      Extraocular Movements: Extraocular movements intact.      Pupils: Pupils are equal, round, and reactive to light.   Cardiovascular:      Rate and Rhythm: Regular rhythm. Bradycardia present.      Pulses: Normal pulses.      Heart sounds: Normal heart sounds.    Pulmonary:      Effort: Pulmonary effort is normal.      Breath sounds: Normal breath sounds.   Abdominal:      General: Bowel sounds are normal.      Palpations: Abdomen is soft.   Musculoskeletal:         General: Normal range of motion.      Cervical back: Normal range of motion.   Skin:     General: Skin is warm.      Capillary Refill: Capillary refill takes less than 2 seconds.   Neurological:      Mental Status: He is alert and oriented to person, place, and time.      Cranial Nerves: No cranial nerve deficit.      Sensory: No sensory deficit.      Motor: Weakness present.      Comments: 4+/5 in left lower extremity  5/5 in right lower extremity, right upper extremity and left upper extremity   Psychiatric:         Mood and Affect: Mood normal.         Behavior: Behavior normal.         Thought Content: Thought content normal.         Judgment: Judgment normal.         LABS / IMAGING / EKG        Labs  Lab Results   Component Value Date    WBC 5.42 11/27/2021    HGB 10.8 (L) 11/27/2021    HCT 34.5 (L) 11/27/2021    MCV 97.7 11/27/2021     (L) 11/27/2021     Lab Results   Component Value Date    GLUCOSE 93 11/27/2021    CALCIUM 8.2 (L) 11/27/2021     11/27/2021    K 4.6 11/27/2021    CO2 20 (L) 11/27/2021     11/27/2021    BUN 51 (H) 11/27/2021    CREATININE 2.4 (H) 11/27/2021       Imaging  CT HEAD WITHOUT IV CONTRAST [[id number]] Collected: 11/27/21 1606   Order Status: Completed Updated: 11/27/21 1618   Narrative:     CLINICAL HISTORY:  Ataxia or coordination problem (Ped 0-18y)     TECHNIQUE: CT of the head without contrast.Multiplanar reformats obtained.     COMPARISON: None available.     CT DOSE:  One or more dose reduction techniques (e.g. automated exposure   control, adjustment of the mA and/or kV according to patient size, use of   iterative reconstruction technique) utilized for this examination.     COMMENT:   Sulcal and ventricular prominence, compatible with age related  involutional   changes. Patchy areas of hypoattenuation are noted in the subcortical and   periventricular white matter, likely related to microangiopathy. The gray-white   differentiation is preserved.  No extra axial collection. No intracranial   hemorrhage. No mass effect, midline shift, or edema. No acute, large vascular   territory infarct.  No apparent acute osseous findings seen.    Impression:     IMPRESSION:   No acute intracranial process.    X-RAY CHEST 1 VIEW [[id number]] Collected: 11/27/21 1357   Order Status: Completed Updated: 11/27/21 3704   Narrative:     CLINICAL HISTORY: Shortness of breath     COMPARISON:Chest x-ray 4/1/2018     COMMENT: Single view of the chest is obtained.     There is no pneumothorax, pleural effusion or focal consolidation. The   cardiopericardial silhouette is normal in size. Overlying soft tissues are   normal.    Impression:     IMPRESSION: No acute pulmonary abnormality.          SARS-CoV-2 (COVID-19) (no units)   Date/Time Value   11/27/2021 1528 Negative       ECG/Telemetry  Sinus bradycardia, HR- 54, no ischemic changes    ASSESSMENT AND PLAN           * Left leg weakness  Assessment & Plan  Rule out stroke  Neurology consulted- Discussed with Dr. Verduzco, advised to get MRI head and MRA head and neck  Neuro checks Q4hr  OT/PT assessment  CT head- no acute findings  MRI head and MRA head and neck ordered  Valium 5mg PO 30mins before MRI ordered  V-doppler lower legs ordered  Aspirin 162mg PO once  Atorvastatin 40mg PO once and daily  On Heparin drip on suspicion of PE   He did not meet the criteria for TPA      CKD (chronic kidney disease) stage 4, GFR 15-29 ml/min (CMS/[facility])  Assessment & Plan  Stable  Baseline creatinine done in the beginning of November at [facility] 2.25  To avoid nephrotoxic drugs and IV contrast    Hypertension  Assessment & Plan  On Lasix 20 mg Po oral daily  On amlodipine 10 mg per oral daily  Hydralazine 10 mg IV push every 8 hour as needed for  SBP/DBP-200/120  2 g sodium diet      Paroxysmal A-fib (CMS/HCC)  Assessment & Plan  On eliquis - missed since 3-4 days  On heparin drip now  Rate controlled without medication  Currently sinus bradycardia  Tele monitoring    Renal cancer (CMS/HCC)  Assessment & Plan  S/P right nephrectomy and right adrenalectomy & IVC thrombectomy in 2013  With recurrence in lung, CNS and liver now, active liver metastasis as per daughter, Plan is Radiation now  On Tivozanib started on 11/03/2021 (completed 21 days and off 7 days), currently off it   Oncology consult      SOB (shortness of breath) on exertion  Assessment & Plan  Saturating well in RA  (Patient off Eliquis since 3-4 days)  SOB on exertion-daughter highly concerned  - CXR- no congestion, no consolidation  D-dimer 1.95  Echo ordered  CTA chest could not be done sooner due to elevated creatinine  Heparin drip ordered, will monitor PT/INR/PTT as per protocol  VQ scan ordered    Diagnosis, management and plan of care discussed with patient in detail.  Patient in agreement with the plan.  Daughter in agreement as well.     VTE Assessment: Pad[place]    VTE Prophylaxis: Current anticoagulants:  heparin (porcine) bolus from bag 3,600-7,200 Units, intravenous, q6h PRN  heparin infusion in 0.45%  units/mL, intravenous, Titrated      Code Status: Full Code      Estimated Discharge Date: 11/29/2021  Disposition Planning: MRI, VQ scan, neurology evaluation, once medically stable, SNF versus home after OT/PT assessment     Jia Borja MD  11/27/2021

## 2021-11-27 NOTE — ASSESSMENT & PLAN NOTE
Baseline creatinine 2.2 to 2.5  Creatinine is up to 2.7 November 30  Creatinine improved with IV fluid but again up after fluids were discontinued December 1  Restarted on IV fluid December 2-continues to have elevated creatinine- cont iv fluiids- creatinine improved - possibly new baseline  Will also start on sodium bicarb.  Encourage oral fluid intake     will restart lasix at discharge    Also + urinary retention managed with straight catheterization as below    BUN is increased likely secondary to steroids    Continue monitoring

## 2021-11-27 NOTE — ED ATTESTATION NOTE
I have personally seen and examined the patient.  I reviewed and agree with physician assistant / nurse practitioner’s assessment and plan of care, with the following exceptions: None  My examination, assessment, and plan of care of  Marshall Garrison is as follows:       Vital Signs Review: Vital signs have been reviewed. The oxygen saturation is  SpO2: 99 % which is  normal.    The patient is an 81-year-old male with past medical history of renal cancer, gout, hypertension, right nephrectomy, prostatectomy, who presented to the emergency department for evaluation of dyspnea.  Patient reports that he has had 3 days of left lower extremity weakness and increased dyspnea with exertion.  Patient states he ran out of his Eliquis medication several days ago.  The patient has history of renal cancer and is currently completing 4 rounds of chemotherapy.  The patient denies headache, neck pain, chest pain, cough, dyspnea at rest, fever, chills, nausea, vomiting, diarrhea, abdominal pain, lower extremity pain, edema, or rash.  Additional history was provided by the patient's daughter.  She reports the patient 3 weeks ago was walking [place] the [place] without difficulty.  Patient reports increased difficulty with ambulation over the past 3 days.  Patient lives alone on second floor of home.    Physical exam: Vital signs reviewed.  General: Patient appears comfortable lying in bed.  HEENT: NCAT, EOMI, MMM.  Neck: Supple, nontender, full range of motion, no JVD.  Chest: Lungs clear to auscultation bilaterally, no rales pain or increased work of breathing.  No respiratory distress.  No wheezing.  Heart: Regular rate and rhythm, no murmurs.  Abdomen: Soft, nontender, nondistended, no guarding, no rebound.  Extremities: No cyanosis, clubbing, edema, or calf tenderness.  Skin: Warm dry, no rash.  Neuro: GCS 15.  5/5 bilateral upper extremity strength.  4+/5 strength left lower extremity dorsiflexion, plantarflexion, knee flexion,  knee extension, 4/5 strength left hip flexion.  5/5 right lower extremity strength.  Sensation normal, cranial nerves II to XII intact.  Affect: Normal.  Pleasant.  Smiling.    Plan: Check labs, EKG, chest x-ray, CT scan brain rule out CVA.  The patient does not meet criteria for IV TPA as exclusions are present time of onset 3 days ago.    Labs Reviewed   CBC AND DIFF - Abnormal       Result Value    WBC 5.42      RBC 3.53 (*)     Hemoglobin 10.8 (*)     Hematocrit 34.5 (*)     MCV 97.7      MCH 30.6      MCHC 31.3 (*)     RDW 15.9 (*)     Platelets 111 (*)     MPV 8.7 (*)     Differential Type Auto      nRBC 0.0      Immature Granulocytes 0.6      Neutrophils 85.2      Lymphocytes 7.9      Monocytes 4.4      Eosinophils 1.5      Basophils 0.4      Immature Granulocytes, Absolute 0.03      Neutrophils, Absolute 4.62      Lymphocytes, Absolute 0.43 (*)     Monocytes, Absolute 0.24 (*)     Eosinophils, Absolute 0.08      Basophils, Absolute 0.02     COMPREHENSIVE METABOLIC PANEL - Abnormal    Sodium 136      Potassium 4.6      Chloride 107      CO2 20 (*)     BUN 51 (*)     Creatinine 2.4 (*)     Glucose 93      Calcium 8.2 (*)     AST (SGOT) 27      ALT (SGPT) 18      Alkaline Phosphatase 135 (*)     Total Protein 6.6      Albumin 3.0 (*)     Bilirubin, Total 0.6      eGFR 26.0 (*)     Anion Gap 9     B-TYPE NATRIURETIC PEPTIDE - Abnormal     (*)    TROPONIN I - Normal    Troponin I <0.02     SARS-COV-2 (COVID 19), PCR    Narrative:     The following orders were created for panel order SARS-CoV-2 (COVID-19), PCR Nasopharynx.  Procedure                               Abnormality         Status                     ---------                               -----------         ------                     SARS-COV-2 (COVID-19)/ F...[[id number]]                                                   Please view results for these tests on the individual orders.   SARS-COV-2 (COVID-19)/ FLU A/B, AND RSV, PCR   RAINBOW DRAW  PANEL    Narrative:     The following orders were created for panel order [place] Draw Panel.  Procedure                               Abnormality         Status                     ---------                               -----------         ------                     RAINBOW RED[[id number]]                                      In process                 RAINBOW LT BLUE[[id number]]                                  In process                 RAINBOW GOLD[[id number]]                                     In process                   Please view results for these tests on the individual orders.   RAINBOW RED   RAINBOW LT BLUE   RAINBOW GOLD     X-RAY CHEST 1 VIEW   Final Result   IMPRESSION: No acute pulmonary abnormality.         ECG 12 lead   ED Interpretation   Interpreted with ED attending.   Rhythm: sinus bradycardia.   Rate: 54 bpm.  P wave interval: normal interval.  QRS: normal QRS.  QTc: normal.  ST Segments: no obvious ST elevation.    Rhythm Strip: sinus bradycardia.     O2 sat: normal. 99% on RA.       US venous leg bilateral    (Results Pending)   CT HEAD WITHOUT IV CONTRAST    (Results Pending)     1649: I discussed case with [place] [facility] [place] the patient in the emergency department. She will order heparin infusion.  Elevated D-dimer.  Unable to perform CTA due to renal disease.     Critical Care  Performed by: Joey Espinoza MD  Authorized by: Joey Espinoza MD     Critical care provider statement:     Critical care time (minutes):  90    Critical care time was exclusive of:  Separately billable procedures and treating other patients    Critical care was necessary to treat or prevent imminent or life-threatening deterioration of the following conditions:  Respiratory failure and CNS failure or compromise    Critical care was time spent personally by me on the following activities:  Ordering and performing treatments and interventions, ordering and review of laboratory studies, ordering  and review of radiographic studies, pulse oximetry, re-evaluation of patient's condition, discussions with consultants, evaluation of patient's response to treatment, examination of patient and obtaining history from patient or surrogate    I assumed direction of critical care for this patient from another provider in my specialty: no            Impression: Acute left lower extremity weakness.  Rule out CVA. Acute dyspnea.       I was physically present for the key/critical portions of the following procedures: None     Joey Espinoza MD  11/27/21 2725

## 2021-11-27 NOTE — ASSESSMENT & PLAN NOTE
In sinus rhythm  Cont Eliquis  Echocardiogram: Ejection fraction 60 to 65%, no wall motion abnormality, no significant valvular heart disease

## 2021-11-27 NOTE — ASSESSMENT & PLAN NOTE
On Norvasc and Lasix at home.  Blood pressure is elevated.  Continue Norvasc, increased to 10 mg  Hold Lasix  Started on hydralazine    Blood pressure is controlled

## 2021-11-28 PROBLEM — D69.6 THROMBOCYTOPENIA (CMS/HCC): Status: ACTIVE | Noted: 2021-01-01

## 2021-11-28 NOTE — PLAN OF CARE
Problem: Adult Inpatient Plan of Care  Goal: Plan of Care Review  Outcome: Progressing  Flowsheets (Taken 11/28/2021 1112)  Progress: improving  Plan of Care Reviewed With: patient  Outcome Summary: OT Evaluation Complete [facility] 15 Reflects need for Moderate Assistnace with Functional Mobility/ Transfer as well as Moderate Assist with BADL due to Diminished Coordination, Strenght and Functional Cognition which is a deviation from PLOF/ Recommend SNF when Medically Appropriate vs Acute Care InPatient Rehab pending Progress

## 2021-11-28 NOTE — PROGRESS NOTES
Patient:  Marshall Garrison  Location:  [facility] 3 Main 0335  MRN:  [id number]  Today's date:  11/28/2021       Pt. supine in bed on fitted sheet, incontinence pad, bed alarm on, call bell and all needs in reach, reviewed mobility safety protocol with patient.  Discussed OT Evaluative Findings including Orthostatic Findings as well as Recommendations with RN.         Marshall is a 81 y.o. male admitted on 11/27/2021 with Shortness of breath [R06.02]  Left leg weakness [R29.898]. Principal problem is Left leg weakness.    Past Medical History  Marshall has a past medical history of Gout, Hypertension, and Renal cancer, right (CMS/HCC).    History of Present Illness   Admitted 11/27/21 with Shortness of breath and fatigue.  History of A. fib and on Eliquis.  Forgot Eliquis x2 days as he lost his prescription.  Right nephrectomy.  History of cancer.  Leg swelling.    HCT 11/27 (-), CXR (-), MRI pending      OT Vitals    Date/Time Pulse O2 Therapy BP BP Location BP Method Pt Position Observations [facility]   11/28/21 1014 67 None (Room air) 180/89 Left upper arm Automatic Lying -- [facility]   11/28/21 1020 66 -- 162/90 Left upper arm Automatic Lying -- [facility]   11/28/21 1028 91 -- 112/74 Left upper arm Automatic Sitting [facility]      OT Pain    Date/Time Pain Type Rating: Rest Rating: Activity [facility]   11/28/21 1014 Pain Assessment 0 - no pain 0 - no pain [facility]   11/28/21 1020 Pain Reassessment 0 - no pain 0 - no pain [facility]   11/28/21 1028 Pain Reassessment 0 - no pain 0 - no pain [facility]          Prior Living Environment      Most Recent Value   People in Home alone  [dog]   Current Living Arrangements home  [2 story FF to bed and Full Bath]   Home Accessibility --  [Stall Shower]   Living Environment Comment Supportive Daughter Close By        Prior Level of Function      Most Recent Value   Dominant Hand right   Ambulation independent   Transferring independent   Toileting independent   Bathing independent   Dressing independent   Eating  "independent   Prior Level of Function Comment Patient reports Indep in All Aspects of ADL and IADL   Assistive Device Currently Used at Home none        Occupational Profile      Most Recent Value   Reason for Services/Referral Diminished Indep in BADL and Mobility   Successful Occupations Retired from [employer]   Occupational History/Life Experiences Father of 1 Daughter / Enjoys Golfing   Performance Patterns PLOF Indep in BADL and Mobility w/o AD   Environmental Supports and Barriers Resides Alone with Dog   Patient Goals \"That's it for today, I'll need to lay back down, can you help me.\"           OT Evaluation and Treatment - 11/28/21 1012        OT Time Calculation    Start Time 1012     Stop Time 1036     Time Calculation (min) 24 min        Session Details    Document Type initial evaluation     Mode of Treatment occupational therapy        General Information    Patient Profile Reviewed yes     Onset of Illness/Injury or Date of Surgery 11/27/21     Referring Physician Huong     Patient/Family/Caregiver Comments/Observations RN Cleared for OT Evaluation     General Observations of Patient Patient received reclined in bed / Willing to participate in OT Evaluation     Existing Precautions/Restrictions aspiration;fall        Living Environment    Primary Care Provided by self        Cognition/Psychosocial    Affect/Mental Status (Cognition) WFL;flat/blunted affect     Orientation Status (Cognition) oriented to;person;place;situation   Relied upon Communication board for Date/ Notes most recent holiday    Follows Commands (Cognition) follows two-step commands;over 90% accuracy;verbal cues/prompting required     Cognitive Function executive function deficit;safety deficit     Executive Function Deficit (Cognition) minimal deficit;insight/awareness of deficits     Safety Deficit (Cognition) minimal deficit;insight into deficits/self-awareness;awareness of need for assistance     Cognitive " Interventions/Strategies external compensatory strategy training;environmental modifications;occupation/activity based interventions        Hearing Assessment    Hearing Status WFL        Vision Assessment/Intervention    Visual Impairment/Limitations corrective lenses for reading        Sensory Assessment (Somatosensory)    Sensory Assessment (Somatosensory) UE sensation intact     Left UE Sensory Assessment general sensation;light touch awareness;intact     Right UE Sensory Assessment general sensation;light touch awareness;intact        Range of Motion (ROM)    Range of Motion ROM is WFL;bilateral upper extremities     Left Upper Extremity (ROM) left UE ROM is WFL;shoulder     Shoulder, Left (ROM) 0-140* FF     Right Upper Extremity (ROM) right UE ROM is WFL;shoulder     Shoulder, Right (ROM) 0-140* FF        Strength (Manual Muscle Testing)    Strength (Manual Muscle Testing) strength is WFL;bilateral upper extremities     Left Upper Extremity Strength left UE strength is WFL     Shoulder, Left (Strength) 3+     Elbow, Left (Strength) 3+     Wrist, Left (Strength) 3+     Hand, Left (Strength) 3+     Right Upper Extremity Strength right UE strength is WFL     Shoulder, Right (Strength) 3+     Elbow, Right (Strength) 3+     Wrist, Right (Strength) 3+     Hand, Right (Strength) 3+        Bed Mobility    [place], Supine to Sit moderate assist (50-74% patient effort);1 person assist;verbal cues     Verbal Cues (Supine to Sit) preparatory posture;technique     [place], Sit to Supine moderate assist (50-74% patient effort);1 person assist;verbal cues     Verbal Cues (Sit to Supine) technique;safety     Assistive Device bed rails;head of bed elevated     Comment (Bed Mobility) Poor Control transitionng Sit to Supine        Transfers    Comment Patient Sat EOB became Orthostatic further mobility transfers not pursued at this time/ Patient used weight shift strategy to slide up edge og bed for ^ positioning with  Close Supervision / Patient Sat EOB for 6 Minutes        Safety Issues, Functional Mobility    Safety Issues Affecting Function (Mobility) awareness of need for assistance;insight into deficits/self-awareness     Impairments Affecting Function (Mobility) coordination;endurance/activity tolerance;motor control;strength;cognition     Cognitive Impairments, Mobility Safety/Performance insight into deficits/self-awareness        Balance    Balance Assessment sitting static balance;sitting dynamic balance     Static Sitting Balance WFL;unsupported;sitting, edge of bed     Dynamic Sitting Balance mild impairment;unsupported;sitting, edge of bed   Requires Close Supervision when seated EOB    Comment, Balance Unable to Assess Standing Balance due to Orthostatic        Motor Skills    Motor Skills coordination;functional endurance     Coordination left;upper extremity;minimal impairment;lower extremity;moderate impairment     Functional Endurance fair-   orthostatic       Lower Body Dressing    Tasks don;socks     [place] moderate assist (50-74% patient effort)     Position edge of bed sitting     Adaptive Equipment none     Comment Patient difficulity assuming and maintaining Functional Dressing position / Uses UE to manage and manipulate Left LE        BADL Safety/Performance    Impairments, BADL Safety/Performance cognition;endurance/activity tolerance;coordination;motor control;strength     Cognitive Impairments, BADL Safety/Performance awareness, need for assistance;insight into deficits/self-awareness;problem-solving/reasoning     Skilled BADL Treatment/Intervention BADL process/adaptation training;cognitive/safety deficit modifications;environmental modifications     Progress in BADL Status level of supervision required;cueing required;use of compensatory strategies;use of environmental adaptations;effort and initiation        ADL Interventions    Self-Care (BADL) Promotion activity adapted to sitting;best  position for BADL determined;close supervision for safety provided;out of bed for BADLs encouraged;set-up provided        Coping    Observed Emotional State calm;cooperative;pleasant     Verbalized Emotional State acceptance;hopefulness     Trust Relationship/Rapport care explained;choices provided;questions answered;reassurance provided        AM-PAC (TM) - ADL (Current Function)    Putting on and taking off regular lower body clothing? 2 - A Lot     Bathing? 2 - A Lot     Toileting? 2 - A Lot     Putting on/taking off regular upper body clothing? 3 - A Little     How much help for taking care of personal grooming? 3 - A Little     Eating meals? 3 - A Little     AM-PAC (TM) ADL Score 15        Assessment/Plan (OT)    Daily Outcome Statement OT Evaluation Complete [facility] 15 Reflects need for Moderate Assistnace with Functional Mobility/ Transfer as well as Moderate Assist with BADL due to Diminished Coordination, Strenght and Functional Cognition which is a deviation from PLOF/ Recommend SNF when Medically Appropriate vs Acute Care InPatient Rehab pending Progress     Rehab Potential good, to achieve stated therapy goals     Therapy Frequency 5 times/wk     Planned Therapy Interventions BADL retraining;occupation/activity based interventions;transfer/mobility retraining;activity tolerance training               OT Assessment/Plan      Most Recent Value   OT Recommended Discharge Disposition acute rehab/Inpatient Rehab Facility at 11/28/2021 1012   Anticipated Equipment Needs At Discharge (OT) other (see comments)  [to be determined at next LOC] at 11/28/2021 1012   Patient/Family Therapy Goal Statement Go to a rehab I guess I'll have to at 11/28/2021 1012                    Education Documentation  Signs/Symptoms, taught by Anisha Titus, OT at 11/28/2021 11:14 AM.  Learner: Patient  Readiness: Acceptance  Method: Explanation, Demonstration  Response: Needs Reinforcement, Verbalizes  Understanding  Comment: Role of OT in Acute Care Setting   OT POC   Safe Mobility Strategies   Adaptive BADL Techniques   Positioning for Comfort and Function          OT Goals      Most Recent Value   Transfer Goal 1    Activity/Assistive Device sit-to-stand/stand-to-sit, bed-to-chair/chair-to-bed, toilet, shower at 11/28/2021 1012   [place] modified independence at 11/28/2021 1012   Time Frame by discharge at 11/28/2021 1012   Progress/Outcome goal ongoing at 11/28/2021 1012   Bathing Goal 1    Activity/Assistive Device bathing skills, all at 11/28/2021 1012   Time Frame by discharge at 11/28/2021 1012   Progress/Outcome goal ongoing at 11/28/2021 1012   Dressing Goal 1    Activity/Adaptive Equipment dressing skills, all at 11/28/2021 1012   [place] modified independence at 11/28/2021 1012   Time Frame by discharge at 11/28/2021 1012   Progress/Outcome goal ongoing at 11/28/2021 1012   Toileting Goal 1    Activity/Assistive Device toileting skills, all at 11/28/2021 1012   [place] modified independence at 11/28/2021 1012   Time Frame by discharge at 11/28/2021 1012   Progress/Outcome goal ongoing at 11/28/2021 1012

## 2021-11-28 NOTE — ASSESSMENT & PLAN NOTE
81-year-old gentleman with metastatic renal cell carcinoma who is medical oncology care is at the [facility].  Recently completed a course of tivozanib.  Plans to hold it while he undergoes SBRT to the larger liver lesions.  Continue to hold the medication until he returns to see his physicians at [place].  12/02/21 patient to have follow-up with his oncology team at [place] after discharge.  Will sign off.  If can be of further assistance please do not hesitate to contact us.

## 2021-11-28 NOTE — PROGRESS NOTES
Hospital Medicine Service -  Daily Progress Note       SUBJECTIVE   Interval History: No overnight event.  Patient reports feeling okay.  Still has weakness on the left lower extremity and difficulty in ambulation.  OT PT assessed the patient and recommended rehab.     OBJECTIVE      Vital signs in last 24 hours:  Temp:  [36.4 °C (97.6 °F)-36.9 °C (98.4 °F)] 36.6 °C (97.8 °F)  Heart Rate:  [52-91] 91  Resp:  [14-24] 18  BP: (112-206)/(74-95) 112/74    Intake/Output Summary (Last 24 hours) at 11/28/2021 1344  Last data filed at 11/28/2021 1000  Gross per 24 hour   Intake 240 ml   Output 2200 ml   Net -1960 ml       PHYSICAL EXAMINATION      Physical Exam  Vitals and nursing note reviewed.   Constitutional:       Appearance: Normal appearance.   HENT:      Head: Normocephalic and atraumatic.      Nose: Nose normal.      Mouth/Throat:      Mouth: Mucous membranes are moist.   Eyes:      Extraocular Movements: Extraocular movements intact.      Pupils: Pupils are equal, round, and reactive to light.   Cardiovascular:      Rate and Rhythm: Regular rhythm.  Normal heart rate  Pulses: Normal pulses.      Heart sounds: Normal heart sounds.   Pulmonary:      Effort: Pulmonary effort is normal.      Breath sounds: Normal breath sounds.   Abdominal:      General: Bowel sounds are normal.      Palpations: Abdomen is soft.   Musculoskeletal:         General: Normal range of motion.      Cervical back: Normal range of motion.   Skin:     General: Skin is warm.      Capillary Refill: Capillary refill takes less than 2 seconds.   Neurological:      Mental Status: He is alert and oriented to person, place, and time.      Cranial Nerves: No cranial nerve deficit.      Sensory: No sensory deficit.      Motor: Weakness present.      Comments: 4+/5 in left lower extremity  5/5 in right lower extremity, right upper extremity and left upper extremity   Psychiatric:         Mood and Affect: Mood normal.         Behavior: Behavior  normal.         Thought Content: Thought content normal.         Judgment: Judgment normal.      LINES, CATHETERS, DRAINS, AIRWAYS, AND WOUNDS   Lines, Drains, and Airways:  Wounds (agree with documentation and present on admission):  Peripheral IV (Adult) 11/27/21 Right Forearm (Active)   Number of days: 1       Peripheral IV (Adult) 11/27/21 Left Antecubital (Active)   Number of days: 1        LABS / IMAGING / TELE      Labs  Lab Results   Component Value Date    WBC 6.13 11/28/2021    HGB 10.0 (L) 11/28/2021    HCT 31.9 (L) 11/28/2021    MCV 97.3 11/28/2021     (L) 11/28/2021     Lab Results   Component Value Date    GLUCOSE 89 11/28/2021    CALCIUM 8.0 (L) 11/28/2021     11/28/2021    K 4.3 11/28/2021    CO2 19 (L) 11/28/2021     11/28/2021    BUN 54 (H) 11/28/2021    CREATININE 2.4 (H) 11/28/2021     SARS-CoV-2 (COVID-19) (no units)   Date/Time Value   11/27/2021 1528 Negative       Imaging    CT HEAD WITHOUT IV CONTRAST [[id number]] Collected: 11/27/21 1606   Order Status: Completed Updated: 11/27/21 1618   Narrative:     CLINICAL HISTORY:  Ataxia or coordination problem (Ped 0-18y)     TECHNIQUE: CT of the head without contrast.Multiplanar reformats obtained.     COMPARISON: None available.     CT DOSE:  One or more dose reduction techniques (e.g. automated exposure   control, adjustment of the mA and/or kV according to patient size, use of   iterative reconstruction technique) utilized for this examination.     COMMENT:   Sulcal and ventricular prominence, compatible with age related involutional   changes. Patchy areas of hypoattenuation are noted in the subcortical and   periventricular white matter, likely related to microangiopathy. The gray-white   differentiation is preserved.  No extra axial collection. No intracranial   hemorrhage. No mass effect, midline shift, or edema. No acute, large vascular   territory infarct.  No apparent acute osseous findings seen.    Impression:      IMPRESSION:   No acute intracranial process.    X-RAY CHEST 1 VIEW [[id number]] Collected: 11/27/21 1357   Order Status: Completed Updated: 11/27/21 4004   Narrative:     CLINICAL HISTORY: Shortness of breath     COMPARISON:Chest x-ray 4/1/2018     COMMENT: Single view of the chest is obtained.     There is no pneumothorax, pleural effusion or focal consolidation. The   cardiopericardial silhouette is normal in size. Overlying soft tissues are   normal.    Impression:     IMPRESSION: No acute pulmonary abnormality.            ECG/Telemetry  I have independently reviewed the telemetry. No events for the last 24 hours.    ASSESSMENT AND PLAN      * Left leg weakness  Assessment & Plan  Rule out stroke  Neurology consulted-recommendations appreciated    MRI head and MRA head and neck to be done today  Neuro checks Q4hr  OT/PT assessment-recommending acute rehab  CT head- no acute findings  MRI head and MRA head and neck ordered, without contrast since patient has CKD  Valium 5mg PO 30mins before MRI ordered  V-doppler lower legs ordered  S/P Aspirin 162mg PO   Atorvastatin 40mg PO once and daily  On Heparin drip on suspicion of PE         Thrombocytopenia (CMS/[facility])  Assessment & Plan  At admission platelet -111, today platelet- 106  No bleeding currently (patient and daughter denied any history of bleeding in the past except for one episode of hematuria when he was diagnosed with renal cell carcinoma in 2013)  Will monitor since patient is on heparin drip    CKD (chronic kidney disease) stage 4, GFR 15-29 ml/min (CMS/[facility])  Assessment & Plan  Stable  Baseline creatinine done in the beginning of November at [facility] 2.25  To avoid nephrotoxic drugs and IV contrast    Hypertension  Assessment & Plan  On Lasix 20 mg Po oral daily  On amlodipine 10 mg per oral daily  Started on Metoprolol 12.5mg daily for persistently elevated blood pressure despite being on home medication  Hydralazine 10 mg IV push every 8 hour as needed  for SBP/DBP-200/120  2 g sodium diet      Paroxysmal A-fib (CMS/HCC)  Assessment & Plan  On eliquis - missed since 3-4 days  On heparin drip now  Rate controlled without medication  Currently sinus   Tele monitoring    Renal cancer (CMS/HCC)  Assessment & Plan  S/P right nephrectomy and right adrenalectomy & IVC thrombectomy in 2013  With recurrence in lung, CNS and liver now, active liver metastasis as per daughter, Plan is Radiation now  On Tivozanib started on 11/03/2021 (completed 21 days and off 7 days), currently off it -gets treatment at [facility]  Oncology on board      SOB (shortness of breath) on exertion  Assessment & Plan  Saturating well in RA  (Patient off Eliquis since 3-4 days)  SOB on exertion-daughter highly concerned  - CXR- no congestion, no consolidation  D-dimer 1.95  Echo ordered  CTA chest could not be done sooner due to elevated creatinine  On heparin drip, will monitor PT/INR/PTT as per protocol  VQ scan ordered likely will be done on Monday, decision on anticoagulation based on VQ scan    Diagnosis, management and plan of care discussed with patient in detail.  Patient in agreement with the plan.     VTE Assessment: [place]    VTE Prophylaxis:  Current anticoagulants:  heparin (porcine) bolus from bag 3,600-7,200 Units, intravenous, q6h PRN  heparin infusion in 0.45%  units/mL, intravenous, Titrated      Code Status: Full Code      Estimated Discharge Date: 11/29/2021     Disposition Planning: Needs VQ scan acute rehab as per OT recommendation     Jia Borja MD  11/28/2021

## 2021-11-28 NOTE — CONSULTS
Inpatient Neurology Consultation - [facility]    Patient Name: Marshall Garrison  Patient : 1940  Patient MRN: [id number]    Date of service: 21  Requesting provider: Jia Borja MD [[id number]]    HPI: Patient is a 81 y.o. with a history of metastatic renal cell carcinoma (no known brain metastases), atrial fibrillation on anticoagulation and chronic kidney disease.  He reports developing left leg weakness approximately 3 days ago.  He denies any arm weakness facial weakness, or visual abnormalities.      • amLODIPine  10 mg oral Daily   • aspirin  162 mg oral Daily   • atorvastatin  40 mg oral Daily (6p)   • furosemide  20 mg oral Daily   • levothyroxine  25 mcg oral Daily (6:30a)   • mirtazapine  15 mg oral Nightly   • oxybutynin  5 mg oral Daily     Past Medical History:   Diagnosis Date   • Gout    • Hypertension    • Renal cancer, right (CMS/HCC)      family history is not on file.     Social History     Tobacco Use   • Smoking status: Former Smoker   • Smokeless tobacco: Never Used   Substance Use Topics   • Alcohol use: No   • Drug use: No     ROS: A 10 point review of systems was performed and negative unless noted above.    Examination:   No acute distress.  Non toxic appearing.  Non labored breathing.  Wide awake and alert.  Normal comprehension and attention.  Able to provide history.  Pupils equal and reactive.  Full extraocular movements.  Face symmetric.  Subtle dysarthria.  No drift in the upper extremities.  4/5 strength in the left lower extremity, 5/5 strength in the right lower extremity.  No sensory abnormalities.    Assessment & Recommendations:   Patient is an 81-year-old gentleman with a history of metastatic renal cell carcinoma (with no known brain metastases) and atrial fibrillation on anticoagulation who presents with several days of left leg weakness, concerning for an acute ischemic event.  Would recommend MRI for further evaluation.   Unfortunately, unable to receive MRI with gadolinium due to renal function which limits the sensitivity of detecting possible small intracranial metastases.  Okay to continue heparin infusion for now.      Mao Verduzco M.D.  Neurology

## 2021-11-28 NOTE — PROGRESS NOTES
Patient: Marshall Garrison  Location:  [facility] [address]  MRN:  [id number]  Today's date:  11/28/2021      Pt supine in bed, alarm set. Incontinence pads changed, gown changed. Call bell/phone/personal items in reach. All immediate patient needs met. RN updated.    Marshall is a 81 y.o. male admitted on 11/27/2021 with Shortness of breath [R06.02]  Left leg weakness [R29.898]. Principal problem is Left leg weakness.    Past Medical History  Marshall has a past medical history of Gout, Hypertension, and Renal cancer, right (CMS/HCC).    History of Present Illness   Admitted 11/27/21 with Shortness of breath and fatigue.  History of A. fib and on Eliquis.  Forgot Eliquis x2 days as he lost his prescription.  Right nephrectomy.  History of cancer.  Leg swelling.    HCT 11/27 (-), CXR (-), MRI without acute infarct      PT Vitals    Date/Time Pulse HR Source SpO2 Pt Activity O2 Therapy BP BP Location BP Method Pt Position Observations [facility]   11/28/21 1500 96 Monitor 98 % At rest None (Room air) 108/70 Left upper arm Automatic Lying PT lying in bed ESL   11/28/21 1510 108 -- -- -- -- 116/72 Left upper arm Automatic Lying lying in bed post ESL      PT Pain    Date/Time Pain Type Rating: Rest Rating: Activity [facility]   11/28/21 1500 Pain Assessment 0 - no pain 0 - no pain ESL          Prior Living Environment      Most Recent Value   People in Home alone   Current Living Arrangements home   Home Accessibility --  [Stall Shower]   Living Environment Comment Lives alone in in 2 story home, ? steps to enter, full flight to B&B, walk-in shower        Prior Level of Function      Most Recent Value   Dominant Hand right   Ambulation independent   Transferring independent   Toileting independent   Bathing independent   Dressing independent   Eating independent   Prior Level of Function Comment Reports living alone, plays golf, reads, walks his Brock retriever. +, independent no AD   Assistive Device Currently Used at Home  "[place]           PT Evaluation and Treatment - 11/28/21 1500        PT Time Calculation    Start Time 1500     Stop Time 1520     Time Calculation (min) 20 min        Session Details    Document Type initial evaluation     Mode of Treatment physical therapy        General Information    Patient Profile Reviewed yes     Onset of Illness/Injury or Date of Surgery 11/27/21     Referring Physician Huong     Patient/Family/Caregiver Comments/Observations RN cleared for therapy     General Observations of Patient Removing clothing with limbs/head hanging off side of bed; arrived to assist in safety within bed     Existing Precautions/Restrictions aspiration;fall        Living Environment    Primary Care Provided by self        Cognition/Psychosocial    Affect/Mental Status (Cognition) anxious;confused;flat/blunted affect     Behavioral Issues (Cognition) withdrawn     Orientation Status (Cognition) oriented to;person;disoriented to;place;time   \"December\" \"[place] [facility]\"    Follows Commands (Cognition) follows one-step commands;75-90% accuracy;increased processing time needed;delayed response/completion;initiation impaired;repetition of directions required;verbal cues/prompting required;physical/tactile prompts required     Cognitive Function executive function deficit;attention deficit;safety deficit;memory deficit     Attention Deficit (Cognition) moderate deficit;concentration;requires cues/redirection to task;restless when unoccupied;perseverates on recent task     Comment, Attention confused, perseverating on removing clothing     Executive Function Deficit (Cognition) moderate deficit;information processing;insight/awareness of deficits;judgment;organization/sequencing;planning/decision-making;problem-solving/reasoning;self-monitoring/self-correction;abstract thinking;impulse control     Comment, Executive Function confused, impaired command following, repeated questioning     Memory Deficit " (Cognition) long-term memory;short-term memory     Comment, Short Term Memory not oriented to place, situation     Safety Deficit (Cognition) moderate deficit;awareness of need for assistance;impulsivity;insight into deficits/self-awareness;judgment;problem-solving;safety precautions awareness;safety precautions follow-through/compliance;at risk behavior observed     Comment, Cognition confused, impulsivity removing lines/clothing (IV pulled out upon arrival, RN informed)     Cognitive Interventions/Strategies external compensatory strategy training;occupation/activity based interventions        Sensory    Hearing Status WFL        Vision Assessment/Intervention    Visual Impairment/Limitations corrective lenses for reading        Sensory Assessment (Somatosensory)    Sensory Assessment (Somatosensory) sensation intact;bilateral LE     Sensory Subjective Reports other (see comments)   denies numbness/tingling BLE       Range of Motion (ROM)    Range of Motion ROM is WFL;bilateral lower extremities     Comment: Range of Motion grossly WFL assessed functionally        Strength (Manual Muscle Testing)    Strength (Manual Muscle Testing) right lower extremity strength deficit;left lower extremity strength deficit     Left Lower Extremity Strength hip;knee;ankle     Hip, Left (Strength) 3/5 hip flx     Knee, Left (Strength) 3+/5 knee flx, ext     Ankle, Left (Strength) 3+/5 ankle DF     Right Lower Extremity Strength hip;knee;ankle     Hip, Right (Strength) 3/5 hip flx     Knee, Right (Strength) 3+/5 knee flx,e xt     Ankle, Right (Strength) 4-/5 ankle DF        Bed Mobility    [place], Supine to Sit moderate assist (50-74% patient effort);verbal cues     Verbal Cues (Supine to Sit) hand placement;safety;preparatory posture     [place], Sit to Supine moderate assist (50-74% patient effort);1 person assist;verbal cues     Verbal Cues (Sit to Supine) technique;safety     Assistive Device bed rails;head of bed  elevated     Comment (Bed Mobility) assist at trunk/legs; impulsive to mobilize with R lateral lean        Transfers    Transfers toilet transfer     Comment stood to void at urinal with RW        Sit to Stand Transfer    [place], Sit to Stand Transfer moderate assist (50-74% patient effort);1 person assist;safety considerations;increased time to complete;verbal cues     Verbal Cues hand placement;safety;proper use of assistive device     Assistive Device walker, front-wheeled     Comment from EOB, stood to void at RW using urinal; ModAx1 to steady balance        Stand to Sit Transfer    [place], Stand to Sit Transfer moderate assist (50-74% patient effort);1 person assist;safety considerations;increased time to complete;verbal cues     Verbal Cues hand placement;safety;proper use of assistive device     Assistive Device walker, front-wheeled     Comment to EOB, assist for eccentric control        Toilet Transfer    Comment stood to void, poor coordination holding urinal and steadying balance        Gait Training    [place], Gait moderate assist (50-74% patient effort);1 person assist;safety considerations;increased time to complete;verbal cues     Assistive Device walker, front-wheeled     Distance in Feet 3 feet     Pattern (Gait) step-to     Deviations/Abnormal Patterns (Gait) bilateral deviations;base of support, narrow;ataxic;gait speed decreased;step length decreased;stride length decreased     Comment (Gait/Stairs) side-stepped with RW to L to HOB, ataxic gait, impaired push-off bilat, flexed posture, assist to steady balance and manage RW        Stairs Training    [place], Stairs not tested;unable to assess;safety considerations        Safety Issues, Functional Mobility    Safety Issues Affecting Function (Mobility) ability to follow commands;at risk behavior observed;awareness of need for assistance;impulsivity;insight into deficits/self-awareness;judgment;positioning of assistive  device;problem-solving;safety precaution awareness;safety precautions follow-through/compliance;sequencing abilities     Impairments Affecting Function (Mobility) cognition;coordination;endurance/activity tolerance;motor planning;motor control;pain;postural/trunk control;range of motion (ROM);strength     Cognitive Impairments, Mobility Safety/Performance awareness, need for assistance;impulsivity;insight into deficits/self-awareness;judgment;problem-solving/reasoning;safety precaution awareness;safety precaution follow-through        Balance    Balance Assessment sitting static balance;sitting dynamic balance;sit to stand dynamic balance;standing static balance;standing dynamic balance     Static Sitting Balance WFL;unsupported;sitting, edge of bed     Dynamic Sitting Balance mild impairment;unsupported;sitting, edge of bed     Sit to Stand Dynamic Balance moderate impairment;supported     Static Standing Balance moderate impairment;supported     Dynamic Standing Balance moderate impairment;supported     Comment, Balance with RW        Motor Skills    Motor Skills functional endurance     Functional Endurance poor        Coping    Observed Emotional State cooperative     Verbalized Emotional State acceptance     Trust Relationship/Rapport care explained;questions answered;questions encouraged        AM-PAC (TM) - Mobility (Current Function)    Turning from your back to your side while in a flat bed without using bedrails? 3 - A Little     Moving from lying on your back to sitting on the side of a flat bed without using bedrails? 2 - A Lot     Moving to and from a bed to a chair? 2 - A Lot     Standing up from a chair using your arms? 2 - A Lot     To walk in a hospital room? 2 - A Lot     Climbing 3-5 steps with a railing? 2 - A Lot     AM-PAC (TM) Mobility Score 13        Assessment/Plan (PT)    Daily Outcome Statement PT eval complete.  is currently greatly below his independent baseline with  +confusion, balance,strength, endurance and safety awareness deficits. Arrived to room with pt de-gowning himself, IV pulled out (RN aware) and perseverating on urinating. ModAx1 sup>sit, STS and urinate at RW, side-step to L ModAx1 and return sit>supine ModAx1. Pt currently with pending imaging/diagnosis but due to current findings and suspected stroke rule out, rec acute inpatient rehab vs SNF. PT will follow as pt will continue to benefit from skiled acute PT services.     Rehab Potential good, to achieve stated therapy goals     Therapy Frequency 5 times/wk     Planned Therapy Interventions balance training;bed mobility training;gait training;home exercise program;manual therapy techniques;motor coordination training;neuromuscular re-education;patient/family education;postural re-education;ROM (range of motion);stair training;strengthening;stretching;transfer training               PT Assessment/Plan      Most Recent Value   PT Recommended Discharge Disposition acute rehab/Inpatient Rehab Facility, skilled nursing facility  [pending diagosis] at 11/28/2021 1500   Anticipated Equipment Needs at Discharge (PT) other (see comments)  [TBD] at 11/28/2021 1500                    Education Documentation  Fall Prevention, taught by Anamaria Cifuentes PT at 11/28/2021  3:45 PM.  Learner: Patient  Readiness: Acceptance  Method: Explanation  Response: Verbalizes Understanding, Needs Reinforcement, No Evidence of Learning  Comment: ROle of pT in acute care, safety with mobility, DC planning, goals of PT, energy conservation, pending imaging    Call Light Use, taught by Anamaria Cifuentes PT at 11/28/2021  3:45 PM.  Learner: Patient  Readiness: Acceptance  Method: Explanation  Response: Verbalizes Understanding, Needs Reinforcement, No Evidence of Learning  Comment: ROle of pT in acute care, safety with mobility, DC planning, goals of PT, energy conservation, pending imaging          PT Goals      Most Recent Value   Bed  Mobility Goal 1    Activity/Assistive Device bed mobility activities, all at 11/28/2021 1500   [place] independent at 11/28/2021 1500   Time Frame by discharge at 11/28/2021 1500   Progress/Outcome goal ongoing at 11/28/2021 1500   Transfer Goal 1    Activity/Assistive Device all transfers, walker, front-wheeled at 11/28/2021 1500   [place] modified independence at 11/28/2021 1500   Time Frame by discharge at 11/28/2021 1500   Progress/Outcome goal ongoing at 11/28/2021 1500   Gait Training Goal 1    Activity/Assistive Device gait (walking locomotion), assistive device use, walker, front-wheeled at 11/28/2021 1500   [place] supervision required at 11/28/2021 1500   Distance 150 at 11/28/2021 1500   Time Frame by discharge at 11/28/2021 1500   Progress/Outcome goal ongoing at 11/28/2021 1500   Stairs Goal 1    Activity/Assistive Device stairs, all skills at 11/28/2021 1500   [place] supervision required at 11/28/2021 1500   Number of Stairs 12 at 11/28/2021 1500   Time Frame by discharge at 11/28/2021 1500   Progress/Outcome goal ongoing at 11/28/2021 1500

## 2021-11-28 NOTE — HOSPITAL COURSE
Marshall is a 81 y.o. male admitted on 11/27/2021 with Shortness of breath [R06.02]  Left leg weakness [R29.898]. Principal problem is Left leg weakness.    Past Medical History  Marshall has a past medical history of Gout, Hypertension, and Renal cancer, right (CMS/HCC).    History of Present Illness   Admitted 11/27/21 with Shortness of breath and fatigue.  History of A. fib and on Eliquis.  Forgot Eliquis x2 days as he lost his prescription.  Right nephrectomy.  History of cancer.  Leg swelling.    HCT 11/27 (-), CXR (-), MRI without acute infarct

## 2021-11-28 NOTE — ASSESSMENT & PLAN NOTE
Undergoing neurologic evaluation.  Await neurology input and MRI results  11/29/21 MRI of the brain unrevealing.  Agree with neurology recommendation for MRI of the lumbar spine  11/30/21:  MRI Lumbar spine done, results pending.

## 2021-11-28 NOTE — CONSULTS
Hematology/Oncology -  Consult Note         History of Present Illness  Subjective     Marshall Garrison is a 81 y.o. male who was admitted for Shortness of breath [R06.02]  Left leg weakness [R29.898].     Patient was referred by hospital medical service  for management recommendations. Records were reviewed.  Patient is 81-year-old gentleman with metastatic renal cell carcinoma whose oncologic care is managed at the [facility].    He also has a history of proximal A. fib for which she takes Eliquis, hypertension, and chronic kidney disease.  He was admitted with left lower extremity weakness.  His family saw him at [facility] and noted that he was having difficulty walking and moving slowly.  He had weakness of his left lower leg.  On admission his hemoglobin was 10.8 with a platelet count of 111.  BUN was 51 creatinine 2.4.  CAT scan of the head showed no acute intracranial process.  MRI and MRA have been ordered and neurology has been consulted.     He was last seen via telemedicine by medical oncology at [place] on November 17, 2021.  His history is as follows  1. Developed gross hematuria in October 2013.  2. CT A/P 10/28/13 showed a right renal mass of 10.8 cm with RP lymphadenopathy.  3. CT A/P with contrast on 10/30/13 confirmed enhancing right renal mass with right renal vein and IVC invasion and a 1.7 x 2.4 cm lymph node.  4. MRV 11/4/13 showed a 9 cm right renal mass invading the right renal vein, a portion of the left renal vein and intrahepatic IVC. CT3b.  5. CT chest 11/4/13 with POLO.  6. Surgery on 11/11/13: right radical nephrectomy, RP lymph node dissection and IVC thrombectomy.  7. Pathology showing a [id number] clear cell G3 RCC with negative renal vein margin.  8. Started on EVEREST on 1/27/14 for C1D1 and completed in February 2015.  9. CT on 2/9/15 with progression of right crural nodule consistent with recurrent disease. Unblinded and was on placebo.  10. CT on 5/6/15 with  "SD.  11. CT on 9/30/15 with progression in the lungs and in the right crural nodule, now grown to ~4 cm.  12. Pazopanib at 800 mg started on 10/12/15. Dose decreased to 600 mg on 4/11/16 due to fatigue, taste change and diarrhea and then down to 400 mg in July 2016.  13. PD by 9/26/16 (~1 year on treatment).  14. C1 of nivolumab on 10/4/16. Reimaging on 12/12/16 with mild progression but continued to treat past progression for possible \"pseudo-progression\". Stopped on 3/13/17 due to PD on second scan.  15. Initiation of Cabozantinib 40 mg daily on 5/2/17.   16. CT on 4/2/18 demonstrates stability of lung lesions, but growth of the mass in the right surgical bed/adrenal bed.  17. Underwent SBRT to right kidney bed (700cGy) by Dr. Huerta 4/25/18-5/4/18  18. Cabozantinib held for ~1 month in July due to dental issues. Re-started at 20 mg on 8/12/18. Held for a couple weeks in August due to on going dental work  19. SBRT to RLL and LLL 9/25/19-10/4/19  20. CT CAP on 2/17/2021 with possible new metastatic lesion in liver and increasing retrocaval metastatic lymphadenopathy.  21. 3/2/2021: MRI abdomen with and without contrast - The abnormality seen in hepatic segment 7 on the prior CT scan correspond with an enhancing mass, most likely a metastasis in the setting of prior renal cell carcinoma. There is stable recurrent tumor in the nephrectomy bed is contiguous with the inferior vena cava and extends into the right retrocrural space.  22. IRB : An Open-label, Randomized Phase 3 Study of [id number] Versus Everolimus in Participants with Advanced Renal Cell Carcinoma that has Progressed After Prior PD-1/L1 and VEGF-Targeted Therapies. --- randomized to everolimus 10 mg. C1 on 3/22/21   23. Dose reduced to everolimus 5 mg on 4/5/21 due to diarrhea, fatigue, elevated creatinine, thrombocytopenia. Off trial on 5/20/21 due to severe side-effects of swelling and fatigue.  24. axitinib 2 mg BID + pembrolizumab started " ~7/26/21. Received 1 dose of pembrolizumab. Both drug stopped 8/16/21 due to toxicities/QOL (diarrhea, fatigue, TATIANA)  25. PD in liver by October 2021 and Tivozanib started on 11/3/21.    He had fiducial markers placed with plans to get SBRT to the 2 larger liver lesions.  Plan was to holdTivozanib before and during radiation.    Patient reports that he is feeling relatively well.  No headache or dizzy spells.  He does have left leg weakness.  No shortness of breath.  No fever or chills.      Review of Systems - Oncology  As above all others negative  Patient Active Problem List   Diagnosis   • Left leg weakness   • SOB (shortness of breath) on exertion   • Renal cancer (CMS/HCC)   • Paroxysmal A-fib (CMS/HCC)   • Hypertension   • CKD (chronic kidney disease) stage 4, GFR 15-29 ml/min (CMS/HCC)                 Past Medical History:   Diagnosis Date   • Gout    • Hypertension    • Renal cancer, right (CMS/HCC)        Past Surgical History:   Procedure Laterality Date   • NEPHRECTOMY Right    • PROSTATECTOMY         Social History     Tobacco Use   • Smoking status: Former Smoker   • Smokeless tobacco: Never Used   Substance Use Topics   • Alcohol use: No   • Drug use: No       History reviewed. No pertinent family history.    Allergies  No known drug allergies    Medications  Current Facility-Administered Medications   Medication Dose Route Frequency Provider Last Rate Last Admin   • amLODIPine (NORVASC) tablet 10 mg  10 mg oral Daily Jia Borja MD       • aspirin enteric coated tablet 162 mg  162 mg oral Daily Jia Borja MD   162 mg at 11/27/21 1751   • atorvastatin (LIPITOR) tablet 40 mg  40 mg oral Daily (6p) Jia Borja MD   40 mg at 11/27/21 2047   • glucose chewable tablet 16-32 g of dextrose  16-32 g of dextrose oral PRN Jia Borja MD        Or   • dextrose 40 % oral gel 15-30 g of dextrose  15-30 g of dextrose oral PRN Jia Borja MD        Or   • glucagon (GLUCAGEN) injection 1 mg  1 mg  "intramuscular PRN Jia Borja MD        Or   • dextrose in water injection 12.5 g  25 mL intravenous PRN Jia Borja MD       • diazePAM (VALIUM) tablet 5 mg  5 mg oral Once PRN Jia Borja MD       • furosemide (LASIX) tablet 20 mg  20 mg oral Daily Jia Borja MD       • heparin (porcine) bolus from bag 3,600-7,200 Units  40-80 Units/kg intravenous q6h PRN Jia Borja MD       • heparin infusion in 0.45%  units/mL  100-4,000 Units/hr intravenous Titrated Jia Borja MD 13.5 mL/hr at 11/28/21 0533 1,350 Units/hr at 11/28/21 0533   • hydrALAZINE (APRESOLINE) injection 10 mg  10 mg intravenous q6h PRN Jia Borja MD   10 mg at 11/27/21 1804   • levothyroxine (SYNTHROID) tablet 25 mcg  25 mcg oral Daily (6:30a) Jia Borja MD   25 mcg at 11/28/21 0535   • LORazepam (ATIVAN) tablet 0.5 mg  0.5 mg oral q6h PRN Jia Borja MD       • mirtazapine (REMERON) tablet 15 mg  15 mg oral Nightly Jia Borja MD   15 mg at 11/27/21 2115   • oxybutynin (DITROPAN) tablet 5 mg  5 mg oral Daily Jia Borja MD            Temp:  [36.3 °C (97.4 °F)-36.9 °C (98.4 °F)] 36.6 °C (97.8 °F)  Heart Rate:  [52-84] 68  Resp:  [14-24] 18  BP: (141-206)/(80-95) 165/80  Visit Vitals  BP (!) 165/80 (BP Location: Left upper arm, Patient Position: Lying)   Pulse 68   Temp 36.6 °C (97.8 °F) (Oral)   Resp 18   Ht 1.803 m (5' 11\")   Wt 89.8 kg (198 lb)   SpO2 98%   BMI 27.62 kg/m²       Physical Exam  Vitals and nursing note reviewed.   Constitutional:       General: He is not in acute distress.     Appearance: Normal appearance. He is well-developed. He is not ill-appearing.   HENT:      Head: Normocephalic and atraumatic.   Eyes:      General: No scleral icterus.     Extraocular Movements: Extraocular movements intact.   Cardiovascular:      Rate and Rhythm: Normal rate and regular rhythm.      Heart sounds: Normal heart sounds.   Pulmonary:      Effort: Pulmonary effort is normal. No respiratory distress.      Breath sounds: " Normal breath sounds. No wheezing, rhonchi or rales.   Abdominal:      General: Bowel sounds are normal. There is no distension.      Palpations: Abdomen is soft. There is no mass.      Tenderness: There is no abdominal tenderness.   Musculoskeletal:      Cervical back: Neck supple.      Right lower leg: No edema.      Left lower leg: No edema.   Lymphadenopathy:      Cervical: No cervical adenopathy.   Skin:     General: Skin is warm and dry.      Coloration: Skin is not jaundiced.   Neurological:      Mental Status: He is alert.      Comments: Left lower leg weakness   Psychiatric:         Mood and Affect: Mood normal.         Behavior: Behavior normal.         Laboratory  Recent Results (from the past 72 hour(s))   ECG 12 lead    Collection Time: 11/27/21  1:11 PM   Result Value Ref Range    Ventricular rate 54     Atrial rate 54     CO Interval 182     QRS duration 104     QT Interval 438     QTC Calculation(Bazett) 415     P Axis 34     R Axis -22     T Wave Axis 40    CBC and differential    Collection Time: 11/27/21  1:20 PM   Result Value Ref Range    WBC 5.42 3.80 - 10.50 K/uL    RBC 3.53 (L) 4.50 - 5.80 M/uL    Hemoglobin 10.8 (L) 13.7 - 17.5 g/dL    Hematocrit 34.5 (L) 40.1 - 51.0 %    MCV 97.7 83.0 - 98.0 fL    MCH 30.6 28.0 - 33.2 pg    MCHC 31.3 (L) 32.2 - 36.5 g/dL    RDW 15.9 (H) 11.6 - 14.4 %    Platelets 111 (L) 150 - 350 K/uL    MPV 8.7 (L) 9.4 - 12.4 fL    Differential Type Auto     nRBC 0.0 <=0.0 %    Immature Granulocytes 0.6 %    Neutrophils 85.2 %    Lymphocytes 7.9 %    Monocytes 4.4 %    Eosinophils 1.5 %    Basophils 0.4 %    Immature Granulocytes, Absolute 0.03 0.00 - 0.08 K/uL    Neutrophils, Absolute 4.62 1.70 - 7.00 K/uL    Lymphocytes, Absolute 0.43 (L) 1.20 - 3.50 K/uL    Monocytes, Absolute 0.24 (L) 0.30 - 1.00 K/uL    Eosinophils, Absolute 0.08 0.04 - 0.54 K/uL    Basophils, Absolute 0.02 0.01 - 0.10 K/uL   Comprehensive metabolic panel    Collection Time: 11/27/21  1:20 PM    Result Value Ref Range    Sodium 136 136 - 144 mEQ/L    Potassium 4.6 3.6 - 5.1 mEQ/L    Chloride 107 98 - 109 mEQ/L    CO2 20 (L) 22 - 32 mEQ/L    BUN 51 (H) 8 - 20 mg/dL    Creatinine 2.4 (H) 0.8 - 1.3 mg/dL    Glucose 93 70 - 99 mg/dL    Calcium 8.2 (L) 8.9 - 10.3 mg/dL    AST (SGOT) 27 15 - 41 IU/L    ALT (SGPT) 18 16 - 63 IU/L    Alkaline Phosphatase 135 (H) 35 - 126 IU/L    Total Protein 6.6 6.0 - 8.2 g/dL    Albumin 3.0 (L) 3.4 - 5.0 g/dL    Bilirubin, Total 0.6 0.3 - 1.2 mg/dL    eGFR 26.0 (L) >=60.0 mL/min/1.73m*2    Anion Gap 9 3 - 15 mEQ/L   Troponin I    Collection Time: 11/27/21  1:20 PM   Result Value Ref Range    Troponin I <0.02 <0.05 ng/mL   B-type natriuretic peptide    Collection Time: 11/27/21  1:20 PM   Result Value Ref Range     (H) <=100 pg/mL   D-DIMER    Collection Time: 11/27/21  1:20 PM   Result Value Ref Range    D-Dimer, Quant 1.95 (H) 0.00 - 0.50 ug/mL FEU   Protime-INR    Collection Time: 11/27/21  1:20 PM   Result Value Ref Range    PT 14.3 12.2 - 14.5 sec    INR 1.2     PTT    Collection Time: 11/27/21  1:20 PM   Result Value Ref Range    PTT 35 23 - 35 sec   SARS-COV-2 (COVID-19)/ FLU A/B, AND RSV, PCR Nasopharynx    Collection Time: 11/27/21  3:28 PM    Specimen: Nasopharynx; Nasopharyngeal Swab   Result Value Ref Range    SARS-CoV-2 (COVID-19) Negative Negative    Influenza A Negative Negative    Influenza B Negative Negative    Respiratory Syncytial Virus Negative Negative   PTT    Collection Time: 11/28/21  1:05 AM   Result Value Ref Range    PTT >230 (HH) 23 - 35 sec   Basic metabolic panel    Collection Time: 11/28/21  2:38 AM   Result Value Ref Range    Sodium 136 136 - 144 mEQ/L    Potassium 4.3 3.6 - 5.1 mEQ/L    Chloride 107 98 - 109 mEQ/L    CO2 19 (L) 22 - 32 mEQ/L    BUN 54 (H) 8 - 20 mg/dL    Creatinine 2.4 (H) 0.8 - 1.3 mg/dL    Glucose 89 70 - 99 mg/dL    Calcium 8.0 (L) 8.9 - 10.3 mg/dL    eGFR 26.0 (L) >=60.0 mL/min/1.73m*2    Anion Gap 10 3 - 15 mEQ/L    Lipid panel    Collection Time: 11/28/21  2:38 AM   Result Value Ref Range    Triglycerides 67 30 - 149 mg/dL    Cholesterol 131 <=200 mg/dL    HDL 41 (L) >=45 mg/dL    LDL Calculated 77 <=100 mg/dL    Non-HDL, Calculated 90 mg/dL    RISK 3.2 <=5.0   CBC    Collection Time: 11/28/21  2:38 AM   Result Value Ref Range    WBC 6.13 3.80 - 10.50 K/uL    RBC 3.28 (L) 4.50 - 5.80 M/uL    Hemoglobin 10.0 (L) 13.7 - 17.5 g/dL    Hematocrit 31.9 (L) 40.1 - 51.0 %    MCV 97.3 83.0 - 98.0 fL    MCH 30.5 28.0 - 33.2 pg    MCHC 31.3 (L) 32.2 - 36.5 g/dL    RDW 16.1 (H) 11.6 - 14.4 %    Platelets 106 (L) 150 - 350 K/uL    MPV 9.6 9.4 - 12.4 fL   PTT    Collection Time: 11/28/21  2:38 AM   Result Value Ref Range    PTT >230 (HH) 23 - 35 sec   PTT    Collection Time: 11/28/21  3:43 AM   Result Value Ref Range     (H) 23 - 35 sec   PTT    Collection Time: 11/28/21  4:48 AM   Result Value Ref Range    PTT 95 (H) 23 - 35 sec       Radiology    CT HEAD WITHOUT IV CONTRAST    Result Date: 11/27/2021  Narrative: CLINICAL HISTORY:  Ataxia or coordination problem (Ped 0-18y) TECHNIQUE: CT of the head without contrast.Multiplanar reformats obtained. COMPARISON: None available. CT DOSE:  One or more dose reduction techniques (e.g. automated exposure control, adjustment of the mA and/or kV according to patient size, use of iterative reconstruction technique) utilized for this examination. COMMENT: Sulcal and ventricular prominence, compatible with age related involutional changes. Patchy areas of hypoattenuation are noted in the subcortical and periventricular white matter, likely related to microangiopathy. The gray-white differentiation is preserved.  No extra axial collection. No intracranial hemorrhage. No mass effect, midline shift, or edema. No acute, large vascular territory infarct.  No apparent acute osseous findings seen.     Impression: IMPRESSION: No acute intracranial process.     X-RAY CHEST 1 VIEW    Result Date:  11/27/2021  Narrative: CLINICAL HISTORY: Shortness of breath COMPARISON:Chest x-ray 4/1/2018 COMMENT: Single view of the chest is obtained. There is no pneumothorax, pleural effusion or focal consolidation. The cardiopericardial silhouette is normal in size. Overlying soft tissues are normal.     Impression: IMPRESSION: No acute pulmonary abnormality.     US venous leg bilateral    Result Date: 11/27/2021  Narrative: CLINICAL HISTORY: LE edema COMMENT: Ultrasound examination of the bilateral lower extremity venous system is performed utilizing gray scale, color, and pulsed Doppler sonography. Comparison: There are no prior examinations available for comparison. Limited study. There is normal response to compression within the bilateral common femoral, superficial femoral, and popliteal veins. The calf veins are suboptimally visualized.     Impression: IMPRESSION: No evidence for bilateral deep venous thrombosis in the visualized veins.      Assessment and Plan  Renal cancer (CMS/HCC)  Assessment & Plan  81-year-old gentleman with metastatic renal cell carcinoma who is medical oncology care is at the [facility] [facility].  Recently completed a course of tivozanib.  Plans to hold it while he undergoes SBRT to the larger liver lesions.  Continue to hold the medication until he returns to see his physicians at [facility].    * Left leg weakness  Assessment & Plan  Undergoing neurologic evaluation.  Await neurology input and MRI results      Sloane Arredondo MD

## 2021-11-28 NOTE — PLAN OF CARE
Problem: Adult Inpatient Plan of Care  Goal: Plan of Care Review  Outcome: Progressing  Flowsheets (Taken 11/28/2021 6844)  Progress: no change  Plan of Care Reviewed With: patient  Outcome Summary: PT eval complete.  is currently greatly below his independent baseline with +confusion, balance,strength, endurance and safety awareness deficits. Arrived to room with pt de-gowning himself, IV pulled out (RN aware) and perseverating on urinating. ModAx1 sup>sit, STS and urinate at RW, side-step to L ModAx1 and return sit>supine ModAx1. Pt currently with pending imaging/diagnosis but due to current findings and suspected stroke rule out, rec acute inpatient rehab vs SNF. PT will follow as pt will continue to benefit from skiled acute PT services.

## 2021-11-29 PROBLEM — E03.9 HYPOTHYROIDISM: Status: ACTIVE | Noted: 2021-01-01

## 2021-11-29 NOTE — PROGRESS NOTES
Hospital Medicine Service -  Daily Progress Note       SUBJECTIVE   Still with left leg weakness  Reports he feels much better today overall  Denies any pain  Denies shortness of breath     OBJECTIVE      Vital signs in last 24 hours:  Temp:  [36.3 °C (97.4 °F)-36.9 °C (98.5 °F)] 36.3 °C (97.4 °F)  Heart Rate:  [] 55  Resp:  [18] 18  BP: (100-188)/(76-95) 184/88    Intake/Output Summary (Last 24 hours) at 11/29/2021 1632  Last data filed at 11/28/2021 2056  Gross per 24 hour   Intake --   Output 700 ml   Net -700 ml       PHYSICAL EXAMINATION      Physical Exam  Vitals and nursing note reviewed.   Constitutional:       Appearance: Normal appearance. He is well-developed and normal weight.   Eyes:      General: No scleral icterus.  Cardiovascular:      Rate and Rhythm: Normal rate and regular rhythm.      Heart sounds: Normal heart sounds.   Pulmonary:      Effort: Pulmonary effort is normal.      Breath sounds: Normal breath sounds.   Abdominal:      General: Bowel sounds are normal.      Palpations: Abdomen is soft. There is no mass.      Tenderness: There is no abdominal tenderness.   Musculoskeletal:         General: No swelling.      Cervical back: Neck supple.   Skin:     General: Skin is warm and dry.   Neurological:      Mental Status: He is alert and oriented to person, place, and time.      Comments: Left leg weakness 4/5   Psychiatric:         Behavior: Behavior normal.        LABS / IMAGING / TELE      Labs  I have reviewed the patient's labs.  PTT 59    Imaging MRI of the brain, echocardiogram, and VQ scan are reviewed      ECG/Telemetry  Reviewed by me: Normal sinus rhythm    ASSESSMENT AND PLAN      * Left leg weakness  Assessment & Plan  MRI of the brain: No acute abnormality    Neurology is following  Check MRI of lumbar spine    Continue anticoagulation        Hypothyroidism  Assessment & Plan  TSH is 16  Increase levothyroxine    Thrombocytopenia (CMS/HCC)  Assessment &  Plan  Mild  Monitor    CKD (chronic kidney disease) stage 4, GFR 15-29 ml/min (CMS/HCC)  Assessment & Plan  Baseline creatinine 2-2.2  Monitor    Hypertension  Assessment & Plan  On Norvasc and Lasix at home.  Blood pressure is elevated.  Will add hydralazine.      Paroxysmal A-fib (CMS/HCC)  Assessment & Plan  In sinus rhythm  Resume Eliquis  Echocardiogram: Ejection fraction 60 to 65%, no wall motion abnormality, no significant valvular heart disease     Renal cancer (CMS/HCC)  Assessment & Plan  S/P right nephrectomy and right adrenalectomy & IVC thrombectomy in 2013  With metastasis in lung, liver, kidney bed  Status post multiple treatments  Status post SBRT to kidney bed in 2018 and to lungs in 2019  The plan is for liver SBRT  On Tivozanib started on 11/03/2021 (completed 21 days and off 7 days), currently off it -gets treatment at [facility]    Oncology is following    SOB (shortness of breath) on exertion  Assessment & Plan  Saturating well in RA  VQ scan: Low probability for pulmonary embolism  Chest x-ray: No acute pulmonary disease    Monitor       Discussed with daughter at bedside  VTE Assessment: Padua    Code Status: Full Code  Estimated discharge date: 12/1/2021     Tabitha West MD  11/29/2021

## 2021-11-29 NOTE — PROGRESS NOTES
Hematology/Oncology -  Daily Progress Note       SUBJECTIVE   Interval History: He just returned from having a test done.  He reports that he is Vic.  Continues with left lower extremity weakness.     OBJECTIVE      Vital signs in last 24 hours:  Temp:  [36.3 °C (97.4 °F)-36.9 °C (98.5 °F)] 36.9 °C (98.5 °F)  Heart Rate:  [] 60  Resp:  [18] 18  BP: (100-188)/(76-95) 164/79    Intake/Output Summary (Last 24 hours) at 11/29/2021 1538  Last data filed at 11/28/2021 2056  Gross per 24 hour   Intake --   Output 700 ml   Net -700 ml       PHYSICAL EXAMINATION          Physical Examination: General appearance -fatigued appearing  Mental status -cooperative  Extremities - , left lower extremity weakness       LABS / IMAGING / TELE      Labs  Recent Results (from the past 24 hour(s))   PTT    Collection Time: 11/28/21  8:38 PM   Result Value Ref Range     (H) 23 - 35 sec   PTT    Collection Time: 11/29/21  3:14 AM   Result Value Ref Range     (HH) 23 - 35 sec   PTT    Collection Time: 11/29/21  4:51 AM   Result Value Ref Range    PTT 75 (H) 23 - 35 sec   Transthoracic echo (TTE) complete    Collection Time: 11/29/21 11:32 AM   Result Value Ref Range    BSA 2.12 m2    AV Mean Gradient 3.00 mmHg    AV VTI 23.90 cm    AV Mean Velocity 0.86 m/s    AV Peak Velocity-S 1.20 m/s    AV Peak Gradient 6.00 mmHg    AV Area 1.63 cm2    AV Area 1.61 cm2    Ao Root Annulus 3.50 cm    Ascend Ao 3.70 cm    IVS 0.99 cm    LVIDd 5.01 5.54 - 7.69 cm    LVIDs 2.90 3.24 - 4.91 cm    LVOT Diameter 2D 2.00 cm    LVOT Mean Gradient 1.00 mmHg    LVOT VTI 12.40 cm    LVOT Mean Velocity  0.45 m/s    LVOT Peak Velocity 0.61 m/s    PW 0.91 cm    LVPWd 0.91 0.70 - 1.31 cm    FS 42.10 %    LVOT Cross-section Area 3.14 cm2    LVOT Stroke Volume 38.94 mL    LAD 2D 2.90 cm    LA/Ao Ratio 0.83     E wave deceleration time 391.00 ms    MV Peak A-Wave 0.80 m/s    MV Peak E-Wave 0.52 m/s    E/A ratio 0.70     TAPSE 2.76 cm    TR Peak  Velocity 2.09 m/s    TR Peak Gradient 17.00 mmHg    ZLVIDD -2.65     ZLVIDS -2.52     ZLVPWD -0.26     Est RVSP TR JET 25 mmHg   PTT    Collection Time: 11/29/21 12:21 PM   Result Value Ref Range    PTT 59 (H) 23 - 35 sec       Imaging  MRI BRAIN WITHOUT CONTRAST    Result Date: 11/28/2021  CLINICAL HISTORY: Acute neurologic deficit. Stroke suspected. COMMENT: MRI examination of the brain was performed without intravenous contrast following the [facility] standard protocol. Comparison: CT brain 11/27/2021. Brain parenchyma: Moderate scattered foci of T2/FLAIR hyperintense signal abnormality throughout the cerebral white matter consistent with small vessel disease. No diffusion evidence of acute infarction. No hemorrhage on susceptibility-weighted images. Ventricles, cisterns, and sulci: Diffusely enlarged representing central and cortical cerebral volume loss. Sella and cerebellar tonsils: Normal in appearance. Vascular flow voids: Intact at the skull base. Calvarium and extra cranial soft tissues: Normal. Paranasal sinuses and mastoid air cells: Ethmoid and maxillary mucosal thickening. Otherwise well-aerated.     IMPRESSION: No acute infarct. No acute hemorrhage. No mass or mass effect.     Not applicable  ASSESSMENT AND PLAN      Renal cancer (CMS/HCC)  Assessment & Plan  81-year-old gentleman with metastatic renal cell carcinoma who is medical oncology care is at the [place] [facility].  Recently completed a course of tivozanib.  Plans to hold it while he undergoes SBRT to the larger liver lesions.  Continue to hold the medication until he returns to see his physicians at [place].    * Left leg weakness  Assessment & Plan  Undergoing neurologic evaluation.  Await neurology input and MRI results  11/29/21 MRI of the brain unrevealing.  Agree with neurology recommendation for MRI of the lumbar spine       Code Status: Full Code     Sloane Arredondo MD  11/29/2021  3:38 PM

## 2021-11-29 NOTE — PROGRESS NOTES
Patient: Marshall Garrison  Location:  [facility] 3 [address] 0335  MRN:  [id number]  Today's date:  11/29/2021    Patient in chair at end of session, BLE elevated on leg rest, daughter present t/o and supportive, call bell in pt's lap, patient in NAD, tray table in front of pt w/ needed belongings.    Marshall is a 81 y.o. male admitted on 11/27/2021 with Shortness of breath [R06.02]  Left leg weakness [R29.898]. Principal problem is Left leg weakness.    Past Medical History  Marshall has a past medical history of Gout, Hypertension, and Renal cancer, right (CMS/HCC).    History of Present Illness   Admitted 11/27/21 with Shortness of breath and fatigue.  History of A. fib and on Eliquis.  Forgot Eliquis x2 days as he lost his prescription.  Right nephrectomy.  History of cancer.  Leg swelling.    HCT 11/27 (-), CXR (-), MRI without acute infarct      PT Vitals    Date/Time Pulse SpO2 Pt Activity O2 Therapy BP BP Location BP Method Pt Position Who   11/29/21 1223 60 96 % At rest None (Room air) -- -- -- -- MLP   11/29/21 1238 60 96 % Other (Comment) taken after transfer to chair None (Room air) 164/79 Left upper arm Automatic Sitting MLP          Prior Living Environment      Most Recent Value   People in Home alone   Current Living Arrangements home   Home Accessibility --  [Stall Shower]   Living Environment Comment Lives alone in in 2 story home, ? steps to enter, full flight to B&B, walk-in shower        Prior Level of Function      Most Recent Value   Dominant Hand right   Ambulation independent   Transferring independent   Toileting independent   Bathing independent   Dressing independent   Eating independent   Prior Level of Function Comment Reports living alone, plays golf, reads, walks his Brock retriever. +, independent no AD   Assistive Device Currently Used at Home cane, straight           PT Evaluation and Treatment - 11/29/21 1220        PT Time Calculation    Start Time 1220     Stop Time 1246      Time Calculation (min) 26 min        Session Details    Document Type daily treatment/progress note     Mode of Treatment physical therapy        General Information    Patient Profile Reviewed yes     Patient/Family/Caregiver Comments/Observations RN cleared for therapy session     General Observations of Patient Pt awake in bed, daughter at bedside, mildly slurred speech noted by PT     Existing Precautions/Restrictions aspiration;fall        Cognition/Psychosocial    Affect/Mental Status (Cognition) WFL;flat/blunted affect   mildly blunted, but laughed twice appropriately during session    Behavioral Issues (Cognition) withdrawn     Orientation Status (Cognition) oriented to;person;place;time     Follows Commands (Cognition) follows one-step commands     Cognitive Function executive function deficit     Attention Deficit (Cognition) minimal deficit;focused/sustained attention     Comment, Attention cognition improved since yesterday     Executive Function Deficit (Cognition) minimal deficit        Sensory    Hearing Status hearing impairment, bilaterally   mildly [place]       Vision Assessment/Intervention    Visual Impairment/Limitations corrective lenses for reading        Sensory Assessment (Somatosensory)    Sensory Assessment (Somatosensory) sensation intact;bilateral LE     Left UE Sensory Assessment general sensation;light touch awareness;intact     Right UE Sensory Assessment general sensation;light touch awareness;intact        Range of Motion (ROM)    Range of Motion ROM is WFL;bilateral lower extremities        Bed Mobility    [place], Supine to Sit 1 person assist;verbal cues;increased time to complete;touching/steadying assist     Verbal Cues (Supine to Sit) hand placement;preparatory posture;safety;technique;proper use of assistive device        Sit to Stand Transfer    [place], Sit to Stand Transfer moderate assist (50-74% patient effort);1 person assist;verbal cues;increased time to  complete     Verbal Cues hand placement;safety;proper use of assistive device     Assistive Device walker, front-wheeled     Comment decreased initiation        Stand to Sit Transfer    [place], Stand to Sit Transfer moderate assist (50-74% patient effort);1 person assist     Verbal Cues hand placement;maintaining center of gravity over base of support;preparatory posture;proper use of assistive device;safety;technique     Assistive Device walker, front-wheeled        Gait Training    [place], Gait moderate assist (50-74% patient effort);1 person assist;verbal cues;nonverbal cues (demo/gesture);increased time to complete     Assistive Device walker, front-wheeled     Distance in Feet 3 feet     Pattern (Gait) step-to     Deviations/Abnormal Patterns (Gait) bilateral deviations     Bilateral Gait Deviations heel strike decreased     Comment (Gait/Stairs) amb to chair, VCing for safety w/ RW. Refused further ambulation, stated his feet hurt (qualified by pointing/rubbing upper medial ankles)        Stairs Training    Comment not safe to attempt currently        Safety Issues, Functional Mobility    Safety Issues Affecting Function (Mobility) ability to follow commands;impulsivity;insight into deficits/self-awareness;judgment;safety precaution awareness;safety precautions follow-through/compliance     Impairments Affecting Function (Mobility) coordination;endurance/activity tolerance;strength     Cognitive Impairments, Mobility Safety/Performance safety precaution follow-through        Balance    Static Sitting Balance WFL;unsupported     Dynamic Sitting Balance mild impairment;unsupported;sitting, edge of bed     Sit to Stand Dynamic Balance moderate impairment     Static Standing Balance moderate impairment     Dynamic Standing Balance moderate impairment        Motor Skills    Functional Endurance poor        Therapeutic Exercise    Therapeutic Exercise lower extremity        Lower Extremity (Therapeutic  Exercise)    Exercise Position/Type seated;supine     General Exercise bilateral;ankle pumps;heel slides;gluteal sets     Reps and Sets 10/1     Comment PT encouraged patient to perform t/o day while seated in chair        AM-PAC (TM) - Mobility (Current Function)    Turning from your back to your side while in a flat bed without using bedrails? 3 - A Little     Moving from lying on your back to sitting on the side of a flat bed without using bedrails? 3 - A Little     Moving to and from a bed to a chair? 2 - A Lot     Standing up from a chair using your arms? 2 - A Lot     To walk in a hospital room? 2 - A Lot     Climbing 3-5 steps with a railing? 2 - A Lot     AM-PAC (TM) Mobility Score 14        Assessment/Plan (PT)    Daily Outcome Statement Patient distance again limited, but cognition improved since previous session, recommend acute rehab.     Rehab Potential good, to achieve stated therapy goals     Therapy Frequency 5 times/wk     Planned Therapy Interventions bed mobility training;balance training;gait training;stair training;strengthening               PT Assessment/Plan      Most Recent Value   PT Recommended Discharge Disposition acute rehab/Inpatient Rehab Facility at 11/29/2021 1220   Anticipated Equipment Needs at Discharge (PT) other (see comments)  [TBD at next level of care] at 11/29/2021 1220   Patient/Family Therapy Goals Statement to return to PLOF at 11/29/2021 1220                         PT Goals      Most Recent Value   Bed Mobility Goal 1    Activity/Assistive Device bed mobility activities, all at 11/28/2021 1500   [place] independent at 11/28/2021 1500   Time Frame by discharge at 11/28/2021 1500   Progress/Outcome goal ongoing at 11/28/2021 1500   Transfer Goal 1    Activity/Assistive Device all transfers, walker, front-wheeled at 11/28/2021 1500   [place] modified independence at 11/28/2021 1500   Time Frame by discharge at 11/28/2021 1500   Progress/Outcome goal ongoing at  11/28/2021 1500   Gait Training Goal 1    Activity/Assistive Device gait (walking locomotion), assistive device use, walker, front-wheeled at 11/28/2021 1500   [place] supervision required at 11/28/2021 1500   Distance 150 at 11/28/2021 1500   Time Frame by discharge at 11/28/2021 1500   Progress/Outcome goal ongoing at 11/28/2021 1500   Stairs Goal 1    Activity/Assistive Device stairs, all skills at 11/28/2021 1500   [place] supervision required at 11/28/2021 1500   Number of Stairs 12 at 11/28/2021 1500   Time Frame by discharge at 11/28/2021 1500   Progress/Outcome goal ongoing at 11/28/2021 1500

## 2021-11-29 NOTE — PROGRESS NOTES
Inpatient Neurology Consultation - Follow up  [facility]    Patient Name: Marshall Garrison  Patient : 1940  Patient MRN: [id number]    Date of service: 21  Requesting provider: Tabitha West MD [[id number]]    Subjective:   No acute events.  States left leg weakness slightly improving.    Data review:   MRI head wo - unremarkable    Examination:   Vitals:    21 0310   BP: (!) 188/95   Pulse: 60   Resp: 18   Temp: 36.7 °C (98 °F)   SpO2: 98%     Wide awake. Aaox3.   Speech clear. Face symmetric.  5/5 upper strength.  4/5 proximal, 5/5 distal left leg strength. 5/5 right leg strength.  1+ pat, 0 achilles.  No Babinksi.   Sensation intact throughout. No saddle anesthesia.    Assessment & Recommendations:   History of metastatic RCC without known CNS metastasis and afib on anti coagulation presents with about 1 week of mild proximal left leg weakness.     Suspected stroke but MRI head negative (?MRI negative stroke).    No back pain, radicular symptoms, cauda symptoms to suggest a lumbar process however this remains on the differential.     Myopathy unlikely given unilateral nature.  No myelopathic findings and unilateral nature make a cervical/thoracic process less likely.  No obvious pain points to suggest a musculoskeletal etiology.     Will obtain MRI lumbar spine (needs diazepam prior) and check CK.  Can also consider EMG however 1 week into symptom onset the yield may be low.     Mao Verduzco M.D.  Neurology

## 2021-11-29 NOTE — PLAN OF CARE
Problem: Adult Inpatient Plan of Care  Goal: Readiness for Transition of Care  Intervention: Mutually Develop Transition Plan  Flowsheets (Taken 11/29/2021 1328)  Anticipated Discharge Disposition: skilled nursing facility  Equipment Needed After Discharge: none  Assistive Device/Animal Currently Used at Home: cane, straight  Anticipated Changes Related to Illness: inability to care for self  Current Discharge Risk: lives alone  Readmission Within the Last 30 Days: no previous admission in last 30 days  Patient/Family Anticipated Services at Transition: skilled nursing  Transportation Anticipated:   family or friend will provide   agency  Concerns to be Addressed: discharge planning     SW received c/b from pt's dtr, Ivette, to discuss dcp. Reviewed CCMSW role. Pt resides alone in a 2 [facility]. Pt uses cane for assistance w/ ambulation. Ivette states pt's current living situation is not working for him and they would like to look into SNF to LTC placement. Ivette and YON discussed probable facilities where pt could receive care. Ivette expressed interested in [facility]. SW requested PAC/Aly make referral to [facility] for SNF w/ the possibility of transitioning to LTC. Pt receives oncology care at the [facility] [facility] and Oncology is rec to hold medications until he has his next f/u at the cancer center. Dispo pending at this time. SW will continue to follow for dispo planning

## 2021-11-30 PROBLEM — N18.9 ACUTE KIDNEY INJURY SUPERIMPOSED ON CHRONIC KIDNEY DISEASE (CMS/HCC)  (CMS/HCC): Status: ACTIVE | Noted: 2021-01-01

## 2021-11-30 PROBLEM — N17.9 ACUTE KIDNEY INJURY SUPERIMPOSED ON CHRONIC KIDNEY DISEASE (CMS/HCC)  (CMS/HCC): Status: ACTIVE | Noted: 2021-01-01

## 2021-11-30 NOTE — PROGRESS NOTES
Hematology/Oncology -  Daily Progress Note       SUBJECTIVE   Covering for dr davison  Interval History: +LLE weakness.  MRI lumbar spine today, results pending   OBJECTIVE      Vital signs in last 24 hours:  Temp:  [36.3 °C (97.4 °F)-37.1 °C (98.8 °F)] 36.7 °C (98.1 °F)  Heart Rate:  [55-78] 68  Resp:  [18] 18  BP: (112-184)/(69-94) 160/89    Intake/Output Summary (Last 24 hours) at 11/30/2021 1247  Last data filed at 11/30/2021 0636  Gross per 24 hour   Intake --   Output 1125 ml   Net -1125 ml       PHYSICAL EXAMINATION          Physical Examination: General appearance -fatigued appearing  Mental status -cooperative  Extremities -  left lower extremity weakness       LABS / IMAGING / TELE      Labs  Recent Results (from the past 24 hour(s))   Basic metabolic panel    Collection Time: 11/30/21  3:01 AM   Result Value Ref Range    Sodium 136 136 - 144 mEQ/L    Potassium 4.3 3.6 - 5.1 mEQ/L    Chloride 106 98 - 109 mEQ/L    CO2 20 (L) 22 - 32 mEQ/L    BUN 60 (H) 8 - 20 mg/dL    Creatinine 2.7 (H) 0.8 - 1.3 mg/dL    Glucose 98 70 - 99 mg/dL    Calcium 7.8 (L) 8.9 - 10.3 mg/dL    eGFR 22.7 (L) >=60.0 mL/min/1.73m*2    Anion Gap 10 3 - 15 mEQ/L   CBC    Collection Time: 11/30/21  3:01 AM   Result Value Ref Range    WBC 7.10 3.80 - 10.50 K/uL    RBC 3.00 (L) 4.50 - 5.80 M/uL    Hemoglobin 9.3 (L) 13.7 - 17.5 g/dL    Hematocrit 30.1 (L) 40.1 - 51.0 %    .3 (H) 83.0 - 98.0 fL    MCH 31.0 28.0 - 33.2 pg    MCHC 30.9 (L) 32.2 - 36.5 g/dL    RDW 16.6 (H) 11.6 - 14.4 %    Platelets 111 (L) 150 - 350 K/uL    MPV 9.7 9.4 - 12.4 fL   Hepatic function panel    Collection Time: 11/30/21  3:01 AM   Result Value Ref Range    Albumin 2.5 (L) 3.4 - 5.0 g/dL    Bilirubin, Total 0.8 0.3 - 1.2 mg/dL    Bilirubin, Direct 0.1 <=0.4 mg/dL    Alkaline Phosphatase 123 35 - 126 IU/L    AST (SGOT) 25 15 - 41 IU/L    ALT (SGPT) 15 (L) 16 - 63 IU/L    Total Protein 5.4 (L) 6.0 - 8.2 g/dL   Magnesium    Collection Time: 11/30/21  3:01 AM    Result Value Ref Range    Magnesium 1.9 1.8 - 2.5 mg/dL       Imaging  NUCLEAR MEDICINE LUNG VENTILATION PERFUSION AEROSOL    Result Date: 11/29/2021  CLINICAL HISTORY: Suspected pulmonary embolism.     IMPRESSION: Low probability for pulmonary embolism. COMMENT: Comparison: Chest x-ray November 27, 2021. Technique: Following the inhalation of 27.5 mCi of TC99m-DTPA aerosol and intravenous administration of 4.4 mCi of Tc99m-MAA, a ventilation scan followed by perfusion lung scan were obtained. Findings: Ventilation scan shows no ventilation defect. Perfusion scan shows normal pulmonary perfusion. There are no segmental perfusion defects.     Transthoracic echo (TTE) complete    Result Date: 11/29/2021  · Normal-sized LV. Preserved LV systolic function. Estimated EF 60- 65%. Normal LV septal wall motion. Wall motion appears grossly normal. Normal LV wall thickness. Grade I LV diastolic dysfunction. Normal left atrial pressure. · Normal-sized RV. Normal RV systolic function. · Aortic root normal. Sinuses of Valsalva normal-sized. · Trace mitral valve regurgitation. · Tricuspid valve structure is grossly normal. Trace tricuspid valve regurgitation. · Estimated RVSP = 25 mmHg. · IVC is a small caliber vessel (<1.7cm).      Not applicable  ASSESSMENT AND PLAN      Renal cancer (CMS/HCC)  Assessment & Plan  81-year-old gentleman with metastatic renal cell carcinoma who is medical oncology care is at the [place] [facility].  Recently completed a course of tivozanib.  Plans to hold it while he undergoes SBRT to the larger liver lesions.  Continue to hold the medication until he returns to see his physicians at [place].    * Left leg weakness  Assessment & Plan  Undergoing neurologic evaluation.  Await neurology input and MRI results  11/29/21 MRI of the brain unrevealing.  Agree with neurology recommendation for MRI of the lumbar spine  11/30/21:  MRI Lumbar spine done, results pending.       Code Status: Full Code      Pallavi M. Rastogi, MD  11/30/2021  12:47 PM

## 2021-11-30 NOTE — PROGRESS NOTES
Hospital Medicine Service -  Daily Progress Note       SUBJECTIVE   Uneventful night   Lethargic ( got Valium for MRI)  Reports he feels the same    Subsequently, complained of right ankle pain     OBJECTIVE      Vital signs in last 24 hours:  Temp:  [36.5 °C (97.7 °F)-37.1 °C (98.8 °F)] 36.6 °C (97.9 °F)  Heart Rate:  [61-78] 70  Resp:  [18] 18  BP: (112-182)/(69-94) 171/80    Intake/Output Summary (Last 24 hours) at 11/30/2021 1705  Last data filed at 11/30/2021 0636  Gross per 24 hour   Intake --   Output 1125 ml   Net -1125 ml       PHYSICAL EXAMINATION      Physical Exam  Vitals and nursing note reviewed.   Constitutional:       Appearance: Normal appearance. He is well-developed and normal weight.   Eyes:      General: No scleral icterus.  Cardiovascular:      Rate and Rhythm: Normal rate and regular rhythm.      Heart sounds: Normal heart sounds.   Pulmonary:      Effort: Pulmonary effort is normal.      Breath sounds: Normal breath sounds.   Abdominal:      General: Bowel sounds are normal.      Palpations: Abdomen is soft. There is no mass.      Tenderness: There is no abdominal tenderness.   Musculoskeletal:         General: No swelling.      Cervical back: Neck supple.   Skin:     General: Skin is warm and dry.   Neurological:      Mental Status: He is alert.      Comments: Lethargic  Left leg weakness   Psychiatric:         Behavior: Behavior normal.        LABS / IMAGING / TELE      Labs  I have reviewed the patient's labs.  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 2. Edema in the paraspinal muscles, most pronounced from L3 through S1.    Discussed with Dr. Verduzco: Left leg weakness is  likely secondary to lumbar radiculopathy.  We will get neurosurgery consult.    PT noted  Will need SNF        Hypothyroidism  Assessment & Plan  TSH is 16  Increase levothyroxine    Thrombocytopenia (CMS/HCC)  Assessment & Plan  Mild  Monitor    Acute kidney injury superimposed on chronic kidney disease (CMS/HCC)  Assessment & Plan  Baseline creatinine 2-2.2  Creatinine is up to 2.7  Check bladder scan  Will give IV fluids  Hold Lasix  Monitor    Hypertension  Assessment & Plan  On Norvasc and Lasix at home.  Blood pressure is elevated.  Continue Norvasc, increase to 10 mg  Hold Lasix  Started on hydralazine      Paroxysmal A-fib (CMS/HCC)  Assessment & Plan  In sinus rhythm  Cont Eliquis  Echocardiogram: Ejection fraction 60 to 65%, no wall motion abnormality, no significant valvular heart disease     Renal cancer (CMS/HCC)  Assessment & Plan  S/P right nephrectomy and right adrenalectomy & IVC thrombectomy in 2013  With metastasis in lung, liver, kidney bed  Status post multiple treatments  Status post SBRT to kidney bed in 2018 and to lungs in 2019  The plan is for liver SBRT  On Tivozanib started on 11/03/2021 (completed 21 days and off 7 days), currently off it -gets [facility]    Oncology is following    SOB (shortness of breath) on exertion  Assessment & Plan  Saturating well in RA  VQ scan: Low probability for pulmonary embolism  Chest x-ray: No acute pulmonary disease    Monitor       Updated daughter Ivette over the phone  VTE Assessment: [place]    Code Status: Full Code  Estimated discharge date: 12/2/2021     Tabitha West MD  11/30/2021

## 2021-11-30 NOTE — PROGRESS NOTES
Patient: Marshall Garrison  Location:  [facility] [address] 0335  MRN:  [id number]  Today's date:  11/30/2021     Pt returned to bed in direct care of RN at hand off    Marshall is a 81 y.o. male admitted on 11/27/2021 with Shortness of breath [R06.02]  Left leg weakness [R29.898]. Principal problem is Left leg weakness.    Past Medical History  Marshall has a past medical history of Gout, Hypertension, and Renal cancer, right (CMS/HCC).    History of Present Illness   Admitted 11/27/21 with Shortness of breath and fatigue.  History of A. fib and on Eliquis.  Forgot Eliquis x2 days as he lost his prescription.  Right nephrectomy.  History of cancer.  Leg swelling.    HCT 11/27 (-), CXR (-), MRI without acute infarct      PT Vitals    Date/Time Pulse HR Source Pt Activity BP BP Location BP Method Pt Position Observations [facility]   11/30/21 1005 67 Left Brachial At rest 173/81 Left upper arm Automatic Lying PT MM      PT Pain    Date/Time Pain Type Side/Orientation Location Rating: Rest Rating: Activity Interventions [facility]   11/30/21 1005 Pain Assessment right foot 7 8 10/10 to touch/pressure position adjusted MM   11/30/21 1009 Pain Reassessment lower back 0 6 position adjusted MM   11/30/21 1015 Post Activity right foot 5 -- position adjusted MM          Prior Living Environment      Most Recent Value   People in Home alone   Current Living Arrangements home   Home Accessibility --  [Stall Shower]   Living Environment Comment Lives alone in in 2 story home, ? steps to enter, full flight to B&B, walk-in shower        Prior Level of Function      Most Recent Value   Dominant Hand right   Ambulation independent   Transferring independent   Toileting independent   Bathing independent   Dressing independent   Eating independent   Prior Level of Function Comment Reports living alone, plays golf, reads, walks his Brock retriever. +, independent no AD   Assistive Device Currently Used at Home cane, straight           PT  Evaluation and Treatment - 11/30/21 1005        PT Time Calculation    Start Time 1005     Stop Time 1016     Time Calculation (min) 11 min        Session Details    Document Type daily treatment/progress note     Mode of Treatment physical therapy        General Information    Patient Profile Reviewed yes     Referring Physician Jenna     Patient/Family/Caregiver Comments/Observations RN cleared     General Observations of Patient in bed, HOB elevated     Existing Precautions/Restrictions fall;aspiration        Cognition/Psychosocial    Affect/Mental Status (Cognition) WFL     Orientation Status (Cognition) oriented to;person;place     Follows Commands (Cognition) follows one-step commands     Cognitive Function other (see comments)     Comment, Cognition receptive but distracted by pain        Sensory Assessment (Somatosensory)    Left LE Sensory Assessment general sensation;intact     Right LE Sensory Assessment general sensation     Sensory Subjective Reports hypersensitivity   RLE to touch, ankle and foot; denies numbness/tingling       Bed Mobility    [place], Roll Left moderate assist (50-74% patient effort);verbal cues     Verbal Cues (Roll Left) hand placement     [place], Supine to Sit moderate assist (50-74% patient effort);increased time to complete;verbal cues     Verbal Cues (Supine to Sit) proper use of assistive device;safety     [place], Sit to Supine maximum assist (25-49% patient effort);verbal cues     Verbal Cues (Sit to Supine) preparatory posture;technique;safety     Comment (Bed Mobility) pt employs modified log roll tech        Transfers    Comment pt unable to tolerate (R) foot pain to stand        Sit to Stand Transfer    [place], Sit to Stand Transfer unable to assess   incr'd (R) foot pain       Safety Issues, Functional Mobility    Impairments Affecting Function (Mobility) pain;endurance/activity tolerance;postural/trunk control;balance;strength        Balance     Static Sitting Balance WFL;supported;sitting, edge of bed     Dynamic Sitting Balance mild impairment;supported;sitting, edge of bed     Sit to Stand Dynamic Balance unable to perform activity     Static Standing Balance unable to perform activity     Dynamic Standing Balance unable to perform activity     Comment, Balance (R) foot and LBP        AM-PAC (TM) - Mobility (Current Function)    Turning from your back to your side while in a flat bed without using bedrails? 2 - A Lot     Moving from lying on your back to sitting on the side of a flat bed without using bedrails? 2 - A Lot     Moving to and from a bed to a chair? 1 - Total     Standing up from a chair using your arms? 1 - Total     To walk in a hospital room? 1 - Total     Climbing 3-5 steps with a railing? 1 - Total     AM-PAC (TM) Mobility Score 8        Assessment/Plan (PT)    Daily Outcome Statement [facility] mob 8.  Incr'd (R) foot/ankle and LBP, decr'd activity suzie.  Pleasant, cooperative, self-limiting 2* pain.  Pt presents w/ decr'd posture, balance, strength, and endurance, in addition to pain.  This places himne far below baseline performance, indep, and safety.  Cont PT.     Rehab Potential fair, will monitor progress closely     Therapy Frequency 5 times/wk     Planned Therapy Interventions bed mobility training;transfer training;gait training;postural re-education;balance training               PT Assessment/Plan      Most Recent Value   PT Recommended Discharge Disposition acute rehab/Inpatient Rehab Facility at 11/30/2021 1005   Anticipated Equipment Needs at Discharge (PT) --  [TBD at next level of care] at 11/30/2021 1005   Patient/Family Therapy Goals Statement not stated at 11/30/2021 1005                         PT Goals      Most Recent Value   Bed Mobility Goal 1    Activity/Assistive Device bed mobility activities, all at 11/28/2021 1500   [place] independent at 11/28/2021 1500   Time Frame by discharge at 11/28/2021 1500    Progress/Outcome goal ongoing at 11/28/2021 1500   Transfer Goal 1    Activity/Assistive Device all transfers, walker, front-wheeled at 11/28/2021 1500   [place] modified independence at 11/28/2021 1500   Time Frame by discharge at 11/28/2021 1500   Progress/Outcome goal ongoing at 11/28/2021 1500   Gait Training Goal 1    Activity/Assistive Device gait (walking locomotion), assistive device use, walker, front-wheeled at 11/28/2021 1500   [place] supervision required at 11/28/2021 1500   Distance 150 at 11/28/2021 1500   Time Frame by discharge at 11/28/2021 1500   Progress/Outcome goal ongoing at 11/28/2021 1500   Stairs Goal 1    Activity/Assistive Device stairs, all skills at 11/28/2021 1500   [place] supervision required at 11/28/2021 1500   Number of Stairs 12 at 11/28/2021 1500   Time Frame by discharge at 11/28/2021 1500   Progress/Outcome goal ongoing at 11/28/2021 1500

## 2021-11-30 NOTE — PROGRESS NOTES
127 SW received update from [facility] that they are unable to accept unvaccinated pts. SW placed call to pt's dtr, Ivette, who updated that pt received Moderna booster on 11/4. Pt received Moderna 1st dose 2/11 and 2nd dose 3/11. Update provided to [facility] - awaiting f/u and determination

## 2021-11-30 NOTE — PROGRESS NOTES
OCCUPATIONAL THERAPY ACUTE CARE - TREATMENT NOTE     Patient:  Marshall Garrison  Location:  [address]  MRN:  [id number]  Today's date:  2021    Marshall is a 81 y.o. male admitted on 2021 with Shortness of breath [R06.02]  Left leg weakness [R29.898]. Principal problem is Left leg weakness.    Past Medical History  Marshall has a past medical history of Gout, Hypertension, and Renal cancer, right (CMS/HCC).    History of Present Illness   Admitted 21 with Shortness of breath and fatigue.  History of A. fib and on Eliquis.  Forgot Eliquis x2 days as he lost his prescription.  Right nephrectomy.  History of cancer.  Leg swelling.    HCT  (-), CXR (-), MRI without acute infarct      Session details: daily treatment/progress note   occupational therapy    Start time:   820  End time:  840  Time ca min    General Observations  Start: Pt received supine in bed; he was agreeable to therapy and no issues or concerns identified by nurse prior to session   End: Pt supine in bed at end of session; call bell in reach, posey chair alarm activated, all needs met and pt in NAD    Precautions:   aspiration and fall  Limitations: safety/cognition    VITALS     OT Vitals    Date/Time Pulse HR Source O2 Therapy BP BP Location BP Method Pt Position [facility]   21 0830 78 Monitor None (Room air) 112/69 Left upper arm Automatic Lying [facility]G      OT Pain    Date/Time Pain Type Side/Orientation Location Rating: Rest Rating: Activity Interventions [facility]   21 0830 Pain Assessment right foot 5 5 position adjusted CLG          PRIOR LEVEL OF FUNCTION AND LIVING ENVIRONMENT     Prior Level of Function      Most Recent Value   Dominant Hand right   Ambulation independent   Transferring independent   Toileting independent   Bathing independent   Dressing independent   Eating independent   Prior Level of Function Comment Reports living alone, plays golf, reads, walks his Brock retriever. +,  "independent no AD   Assistive Device Currently Used at Home cane, straight          Prior Living Environment      Most Recent Value   People in Home alone   Current Living Arrangements home   Home Accessibility --  [Stall Shower]   Living Environment Comment Lives alone in in 2 story home, ? steps to enter, full flight to B&B, walk-in shower          Occupational Profile      Most Recent Value   Reason for Services/Referral Diminished Indep in BADL and Mobility   Successful Occupations Retired from [employer]   Occupational History/Life Experiences Father of 1 Daughter / Enjoys Golfing   Performance Patterns PLOF Indep in BADL and Mobility w/o AD   Environmental Supports and Barriers Resides Alone with Dog   Patient Goals \"That's it for today, I'll need to lay back down, can you help me.\"          OBJECTIVE     Cognition   Affect/MentalStatus: agitated  Orientation: oriented to person, place and time  Follow Commands: follows one step commands 50-74% of the time and with demonstrated physical/tactile prompts required, repetition of directions required and verbal cues/prompting required  Cognitive Function: executive function deficit: severe deficit (planning/decision making, problem solving/reasoning and self-monitoring/self-correction) and safety deficit: moderate deficit (safety precautions awareness and safety precautions follow-through)    Motor Skills: functional activity tolerance/endurance (Poor)    Balance   Static Dynamic   Sitting Fair - (maintains balance with UE support or CGA) Fair (CGA/SBA; unsupported and minimal difficulty crossing midline without LOB)   Standing Unable to perform activity Unable to perform activity   General Comments:      Functional Transfers   [place] Level DME / AD Cues / Comments   Rol[place] minimum assist draw sheet Right to left for hygiene    Supine to Sit moderate assist and 1 person assist bed rails, draw sheet and head of bed elevated Exit to left  "   Sit to Supine maximum assist and 1 person assist head of bed elevated Left    Sit to Stand dependent none Trailed 4 times, unable to achieve. Noted pt with limited effort during trails.        Activities of Daily Living   [place] Level Cues / DME / Comments   Dressing: Upper Body                      Lower Body      dependent bilateral socks, seated    Grooming/hygiene minimum assist MIN A for stability while seated EOB to wash face hands    Toileting dependent incontinent required total assist for hygiene          AM-PAC ™ - ADL (Current Function)     Putting on/taking off regular LB clothing 1 - Total   Bathing 2 - A Lot   Toileting 1 - Total   Putting on/taking off regular UB clothing 2 - A Lot   Help for taking care of personal grooming 3 - A Little   Eating meals 3 - A Little   AM-PAC ™ ADL Score 12       ASSESSMENT AND RECOMMENDATIONS     Progress Summary  OT treatment session complete. ADL [facility] 12 . Patient continues to present with functional limitations affecting areas of ADL’s and functional transfers. Pt making good progress toward all goals. Currently, patient performs bed mobility MOD-MAX A, unable to achieve stand this date with multiple trails notes, total assist for LB dressing/ toileting, and MIN A for grooming. Pt limited by pain, self limiting, and decrease strength.  Pt would benefit from continued skilled OT services to maximize safety and independence with daily tasks. Recommend discharge to SNF when medically stable.     Therapy Plan  Rehab potential:  good, to achieve stated therapy goals  Therapy frequency:  3-5 times/wk  Therapy interventions:  BADL retraining,occupation/activity based interventions,transfer/mobility retraining,activity tolerance training    Discharge Plan  Recommended discharge:  skilled nursing facility  Anticipated equipment needs:  other (see comments) (to be determined at next LOC)    Patient/Family Therapy Goal Statement: Go to a rehab I guess I'll have  to    OT Goals      Most Recent Value   Transfer Goal 1    Activity/Assistive Device sit-to-stand/stand-to-sit, bed-to-chair/chair-to-bed, toilet, shower at 11/28/2021 1012   [place] modified independence at 11/28/2021 1012   Time Frame by discharge at 11/28/2021 1012   Progress/Outcome goal ongoing at 11/28/2021 1012   Bathing Goal 1    Activity/Assistive Device bathing skills, all at 11/28/2021 1012   Time Frame by discharge at 11/28/2021 1012   Progress/Outcome goal ongoing at 11/28/2021 1012   Dressing Goal 1    Activity/Adaptive Equipment dressing skills, all at 11/28/2021 1012   [place] modified independence at 11/28/2021 1012   Time Frame by discharge at 11/28/2021 1012   Progress/Outcome goal ongoing at 11/28/2021 1012   Toileting Goal 1    Activity/Assistive Device toileting skills, all at 11/28/2021 1012   [place] modified independence at 11/28/2021 1012   Time Frame by discharge at 11/28/2021 1012   Progress/Outcome goal ongoing at 11/28/2021 1012

## 2021-12-01 PROBLEM — M10.9 ACUTE GOUT: Status: ACTIVE | Noted: 2021-01-01

## 2021-12-01 NOTE — PROGRESS NOTES
Patient: Marshall Garrison  Location: [facility] [address]  MRN: [id number]  Today's date: 12/1/2021    Attempted to see patient for therapy. Unable due to medical hold.       Pt currently at x-ray to rule out fracture, will continue to follow

## 2021-12-01 NOTE — PROGRESS NOTES
"   Hospital Medicine Service -  Daily Progress Note       SUBJECTIVE   C/o pain in right ankle yesterday  Currently, reports the pain is on the top of the foot, \" at base of toes\"  + hx of gout    Left leg is without change    Last BM documented November 26   OBJECTIVE      Vital signs in last 24 hours:  Temp:  [36.6 °C (97.9 °F)-37.1 °C (98.8 °F)] 36.7 °C (98 °F)  Heart Rate:  [66-88] 88  Resp:  [18] 18  BP: (128-162)/(68-92) 133/68    Intake/Output Summary (Last 24 hours) at 12/1/2021 1634  Last data filed at 12/1/2021 1400  Gross per 24 hour   Intake --   Output 1125 ml   Net -1125 ml       PHYSICAL EXAMINATION      Physical Exam  Vitals and nursing note reviewed.   Constitutional:       Appearance: Normal appearance. He is well-developed and normal weight.   Eyes:      General: No scleral icterus.  Cardiovascular:      Rate and Rhythm: Normal rate and regular rhythm.      Heart sounds: Normal heart sounds.   Pulmonary:      Effort: Pulmonary effort is normal.      Breath sounds: Normal breath sounds.   Abdominal:      General: Bowel sounds are normal.      Palpations: Abdomen is soft. There is no mass.      Tenderness: There is no abdominal tenderness.   Musculoskeletal:         General: No swelling.      Cervical back: Neck supple.      Comments: Mild erythema, increased temp of right first MTP   Neurological:      Mental Status: He is alert and oriented to person, place, and time.   Psychiatric:         Behavior: Behavior normal.        LABS / IMAGING / TELE      Labs  I have reviewed the patient's labs.  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 2. Edema in the paraspinal muscles, most pronounced from L3 through " S1.    Discussed with Dr. Verduzco yesterday: Left leg weakness is likely secondary to lumbar radiculopathy.  We will get neurosurgery consult.  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  + mild to moderate L sided stenosis at L1-2 and L2-3 that is likely chronic given he is fused across those segments (arthritis will not progress once fused). + painless leg symptoms that I do no believe are due to this chronic stenosis. No NSGY intervention.  Delayed EMG (3-4 weeks of weakness before EMG studies tend to show positive findings)    PT noted  Will need SNF        Acute gout  Assessment & Plan  Complaining of right foot/ankle pain but the pain seems to be located in the first MTP  X-rays without fracture  Restart colchicine  We'll give a dose of Solu-Medrol  Tylenol around-the-clock    Monitor    Hypothyroidism  Assessment & Plan  TSH is 16  Increase levothyroxine    Thrombocytopenia (CMS/HCC)  Assessment & Plan  Mild  Monitor    Acute kidney injury superimposed on chronic kidney disease (CMS/HCC)  Assessment & Plan  Baseline creatinine 2-2.2  Creatinine is up to 2.7 yesterday  Improved with IV fluid, will discontinue and monitor  Hold Lasix      Hypertension  Assessment & Plan  On Norvasc and Lasix at home.  Blood pressure is elevated.  Continue Norvasc, increased to 10 mg  Hold Lasix  Started on hydralazine      Paroxysmal A-fib (CMS/HCC)  Assessment & Plan  In sinus rhythm  Cont Eliquis  Echocardiogram: Ejection fraction 60 to 65%, no wall motion abnormality, no significant valvular heart disease     Renal cancer (CMS/HCC)  Assessment & Plan  S/P right nephrectomy and right adrenalectomy & IVC thrombectomy in 2013  With metastasis in lung, liver, kidney bed  Status post multiple treatments  Status post SBRT to kidney bed in 2018 and to lungs in 2019  The plan is for liver SBRT  On Tivozanib started on 11/03/2021 (completed 21 days and off 7  days), currently off it -gets treatment at [facility]    Oncology is following    SOB (shortness of breath) on exertion  Assessment & Plan  Saturating well in RA  VQ scan: Low probability for pulmonary embolism  Chest x-ray: No acute pulmonary disease    Monitor       Updated daughter Ivette  VTE Assessment: [place]    Code Status: Full Code  Estimated discharge date: 12/2/2021     Tabitha West MD  12/1/2021

## 2021-12-01 NOTE — CONSULTS
Neurosurgery Consultation     Requesting Provider:  Dr. West    Patient seen and examined at 1700 on 11/30/21    CC: Bilateral leg weakness (now right worse than left) severe pain in right foot     Reason for Consultation:  Lumbar stenosis     History of Present Illness:     Marshall Garrison is a 81 y.o. M with PMH of HTN, atrial fibrillation on Eliquis, gout, and metastatic renal cell carcinoma who typically gets his treatment at [facility] but presented to  ED on 11/27/21 with new onset of painless left lower extremity weakness. Our Neurologist, Dr. Verduzco evaluated the patient and ordered new MRI brain and MRA brain/neck to work up for stroke or possible mets/lesions. This was negative for acute intracranial reason for his weakness.  He originally was having proximal more so than distal LLE weakness but on examination today he reports that his left leg is stronger but he is now having severe weakness in his right leg. He states he lives alone and is typically ambulatory without assistance. He is concerned because now he is requiring 1-2 person assistance and unable to weight bear without the use of a walker.  He denies numbness, paresthesias, or radicular pain in his lower extremities.  He denies saddle anesthesia or inability to control bladder/bowel function.  He does have chronic lower back pain but he says that this is not bothersome to him currently.      His biggest complaint at this time is new onset of right foot and ankle pain out of proportion to what he has experienced before.  He is unable to move his right leg due to pain in his foot and ankle. He does not have discomfort in his left leg.     MRI of lumbar spine was obtained to rule out lumbar pathology and Neurosurgery was consulted.       Medical History:   Past Medical History:   Diagnosis Date   • Gout    • Hypertension    • Renal cancer, right (CMS/HCC)        Surgical History:   Past Surgical History:   Procedure Laterality Date   •  NEPHRECTOMY Right    • PROSTATECTOMY         Social History:   Social History     Socioeconomic History   • Marital status: Single     Spouse name: None   • Number of children: None   • Years of education: None   • Highest education level: None   Occupational History   • None   Tobacco Use   • Smoking status: Former Smoker   • Smokeless tobacco: Never Used   Substance and Sexual Activity   • Alcohol use: No   • Drug use: No   • Sexual activity: Defer   Other Topics Concern   • None   Social History Narrative   • None     Social Determinants of Health     Financial Resource Strain:    • Difficulty of Paying Living Expenses: Not on file   Food Insecurity: No Food Insecurity   • Worried About Running Out of Food in the Last Year: Never true   • Ran Out of Food in the Last Year: Never true   Transportation Needs:    • Lack of Transportation (Medical): Not on file   • Lack of Transportation (Non-Medical): Not on file   Physical Activity:    • Days of Exercise per Week: Not on file   • Minutes of Exercise per Session: Not on file   Stress:    • Feeling of Stress : Not on file   Social Connections:    • Frequency of Communication with Friends and Family: Not on file   • Frequency of Social Gatherings with Friends and Family: Not on file   • Attends Cheondo[employer] Services: Not on file   • Active Member of Clubs or Organizations: Not on file   • Attends Club or Organization Meetings: Not on file   • Marital Status: Not on file   Intimate Partner Violence:    • Fear of Current or Ex-Partner: Not on file   • Emotionally Abused: Not on file   • Physically Abused: Not on file   • Sexually Abused: Not on file   Housing Stability:    • Unable to Pay for Housing in the Last Year: Not on file   • Number of Places Lived in the Last Year: Not on file   • Unstable Housing in the Last Year: Not on file       Family History: History reviewed. No pertinent family history.    Allergies: No known drug allergies    Home Medications:  •   apixaban, Take 2.5 mg by mouth 2 times daily.  •  LORazepam, Take 0.5 mg by mouth every 6 hours as needed.  •  mirtazapine, Take 15 mg by mouth nightly.  •  oxybutynin, Take 5 mg by mouth daily.  •  allopurinoL,   •  amLODIPine, Take 5 mg by mouth daily.  •  cabozantinib, Take by mouth once daily.  •  colchicine,   •  furosemide,   •  levothyroxine, Take 25 mcg by mouth daily.  •  lisinopriL, Take 5 mg by mouth daily.    Current Medications:  •  acetaminophen, 650 mg, oral, q6h PRN  •  amLODIPine, 10 mg, oral, Daily  •  apixaban, 2.5 mg, oral, BID  •  glucose, 16-32 g of dextrose, oral, PRN **OR** dextrose, 15-30 g of dextrose, oral, PRN **OR** glucagon, 1 mg, intramuscular, PRN **OR** dextrose in water, 25 mL, intravenous, PRN  •  [Provider Managed Hold] furosemide, 20 mg, oral, Daily  •  hydrALAZINE, 10 mg, intravenous, q6h PRN  •  hydrALAZINE, 25 mg, oral, q8h JOSÉ  •  levothyroxine, 37.5 mcg, oral, Daily (6:30a)  •  mirtazapine, 15 mg, oral, Nightly  •  [Provider Managed Hold] oxybutynin, 5 mg, oral, Daily  •  sodium chloride 0.9 %, , intravenous, Continuous    Review of Systems: 14 point review of systems are negative except for that listed in HPI.    Objective     Vital Signs for the last 24 hours:  Temp:  [36.5 °C (97.7 °F)-37.1 °C (98.8 °F)] 36.8 °C (98.2 °F)  Heart Rate:  [61-78] 71  Resp:  [18] 18  BP: (112-173)/(69-89) 154/79       General physical examination:    Physical Exam   Constitutional: Elderly male in NAD. Frail appearing  HEENT: Normocephalic and atraumatic.   Nose/Ears: Without visible trauma.   Mouth/Throat: Oropharynx is clear but dry MM.   Eyes: Conjunctiva are normal. No scleral icterus.   Neck: Normal range of motion. Neck supple/nontender  Cardiovascular: HRR   Pulmonary/Chest: No evidence of labored breathing   Abdominal: Soft. NT/ND. Audible bowel sounds  Musculoskeletal: He is strictly guarding his right lower extremity which is externally rotated and flexed. He is spastic in his RLE?  Inability to extend his leg due to pain in foot.   Neurological: Oriented to person, place, and time.   Skin: Skin is warm and dry. No rash noted.  Psychiatric: Normal mood and affect. Speech is normal and behavior is normal. Slightly lethargic     Vitals reviewed.    Neurologic examination:    Mental status:  The patient is alert, attentive, and oriented. Speech is clear. Remote and recent memory are normal as well as fund of knowledge.    Neurologic Examination:      PERRL, EOMI, face symmetric, no nystagmus.     Motor:   RUE: D: 5/5, T: , B: /, WE: /, H/, IH:   LUE: D: , T: , B: , WE: , H/5, IH:   RLE: I cannot perform a full motor testing due to his foot and ankle pain. He is screaming when I attempt to test his strength. He is 3/5 in IP, Quad 4-/5, and will not cooperate for distal testing  LLE: IP  4-/5, Q  4/5, DF 5/5, EHL 5/5, PF 5/5    Reflexes: Blunted reflexes 1+ to knee jerk bilat and I cannot get an plantar response 0 .   +clous on left. Again I cannot test his right foot   Sensation: intact to light touch  Cannot assess gait     Labs  Lab Results   Component Value Date    GLUCOSE 98 2021    CALCIUM 7.8 (L) 2021     2021    K 4.3 2021    CO2 20 (L) 2021     2021    BUN 60 (H) 2021    CREATININE 2.7 (H) 2021     Lab Results   Component Value Date    WBC 7.10 2021    HGB 9.3 (L) 2021    HCT 30.1 (L) 2021    .3 (H) 2021     (L) 2021     Lab Results   Component Value Date    ALBUMIN 2.5 (L) 2021    BILITOT 0.8 2021    ALKPHOS 123 2021    BILIDIR 0.1 2021    AST 25 2021    ALT 15 (L) 2021    PROTEIN 5.4 (L) 2021       Imaging  Independent review of most recent imaging and all relevant previous imaging was compared with the reading of the attending radiologist. Significant findings are as below:    MRI shows multilevel lumbar  spondylosis with DISH. He is auto fused across all of the visible levels of the thoracic and lumbar spine.         He has minimal central stenosis with lateral recess and foraminal stenosis most prominent at L1-2 and L2-3. However he is fused with large bridging osteophytes across both of these levels         CT HEAD WITHOUT IV CONTRAST    Result Date: 11/27/2021  CLINICAL HISTORY:  Ataxia or coordination problem (Ped 0-18y) TECHNIQUE: CT of the head without contrast.Multiplanar reformats obtained. COMPARISON: None available. CT DOSE:  One or more dose reduction techniques (e.g. automated exposure control, adjustment of the mA and/or kV according to patient size, use of iterative reconstruction technique) utilized for this examination. COMMENT: Sulcal and ventricular prominence, compatible with age related involutional changes. Patchy areas of hypoattenuation are noted in the subcortical and periventricular white matter, likely related to microangiopathy. The gray-white differentiation is preserved.  No extra axial collection. No intracranial hemorrhage. No mass effect, midline shift, or edema. No acute, large vascular territory infarct.  No apparent acute osseous findings seen.     IMPRESSION: No acute intracranial process.     MRI BRAIN WITHOUT CONTRAST    Result Date: 11/28/2021  CLINICAL HISTORY: Acute neurologic deficit. Stroke suspected. COMMENT: MRI examination of the brain was performed without intravenous contrast following the [facility] standard protocol. Comparison: CT brain 11/27/2021. Brain parenchyma: Moderate scattered foci of T2/FLAIR hyperintense signal abnormality throughout the cerebral white matter consistent with small vessel disease. No diffusion evidence of acute infarction. No hemorrhage on susceptibility-weighted images. Ventricles, cisterns, and sulci: Diffusely enlarged representing central and cortical cerebral volume loss. Sella and cerebellar tonsils: Normal in appearance.  Vascular flow voids: Intact at the skull base. Calvarium and extra cranial soft tissues: Normal. Paranasal sinuses and mastoid air cells: Ethmoid and maxillary mucosal thickening. Otherwise well-aerated.     IMPRESSION: No acute infarct. No acute hemorrhage. No mass or mass effect.     MRI LUMBAR SPINE WITHOUT CONTRAST    Result Date: 11/30/2021  CLINICAL HISTORY: Low back pain. History of renal cancer status post right nephrectomy. COMMENT: Technique: Unenhanced magnetic resonance imaging of the lumbar spine was performed utilizing sagittal and axial T1 and T2 weighted sequences as well as a sagittal STIR sequence. Comparison: None Findings: The study is degraded by artifact from motion, particularly on the axial sequences. There is straightening of the normal lumbar lordosis. The lumbar vertebral bodies are maintained in height apart from degenerative remodeling. A trace retrolisthesis of L2 on L3 and of L3 on L4 is present. There is also a trace retrolisthesis of L5 on S1. Irregularities, marginal hypertrophy and discogenic marrow signal changes are present, most pronounced surrounding the L2-L3 intervertebral discs. There is desiccation of the lumbar intervertebral discs with associated loss of intervertebral disc space height diffusely. There is severe loss of intervertebral disc space height at L2-L3, L4-L5 and at L5-S1. A Schmorl's node is noted in the inferior endplate of L1. Edema is noted in the paraspinal musculature in the lower lumbar spine, most pronounced from L3 to S1. The conus medullaris terminates at L1. At T11-T12, there is right greater than left facet hypertrophy without significant central canal or neural foraminal narrowing. At T12-L1, there is facet hypertrophy without consequence. At L1-L2, there is a disc osseous complex and facet and ligamentous hypertrophy. A superimposed right foraminal protrusion is present. The central canal is mildly narrowed tricompartmentally. Moderate left  subarticular and mild left and moderate right neural foraminal narrowing is present. At L2-L3, there is a disc osseous complex and facet and ligamentous hypertrophy. The central canal appears moderately narrowed tricompartmentally. A superimposed left subarticular to foraminal protrusion is present with severe left subarticular and neural foraminal narrowing. There is impingement on the left L2 dorsal root ganglion and on the left L3 nerve root in the lateral recess. Moderate right subarticular and neural foraminal narrowing is present. At L3-L4, there is a disc osseous complex and facet and ligamentous hypertrophy. The thecal sac is mildly effaced. Moderate bilateral neural foraminal narrowing is present. At L4-L5, there is a disc osseous complex and facet hypertrophy. The ventral thecal sac is mildly effaced. Moderate bilateral neural foraminal narrowing is present. At L5-S1, there is a disc osseous complex and facet hypertrophy. The ventral thecal sac is mildly effaced. Moderate bilateral neural foraminal narrowing is present. The right kidney is not visualized and is reportedly surgically absent. Previously reported local tumor recurrence involving the right diaphragmatic arron and IVC and retroperitoneal lymphadenopathy as well as hepatic metastases are better assessed on dedicated abdominal imaging. T2 hyperintense lesions are noted in the left kidney which are suboptimally assessed on the current examination but may represent cysts.     IMPRESSION: 1. Degenerative and discogenic disease of the lumbar spine, most pronounced at L2-L3, as above. 2. Edema in the paraspinal muscles, most pronounced from L3 through S1. 3. Previously reported abdominal findings are incompletely imaged and therefore incompletely assessed.    NUCLEAR MEDICINE LUNG VENTILATION PERFUSION AEROSOL    Result Date: 11/29/2021  CLINICAL HISTORY: Suspected pulmonary embolism.     IMPRESSION: Low probability for pulmonary embolism. COMMENT:  Comparison: Chest x-ray November 27, 2021. Technique: Following the inhalation of 27.5 mCi of TC99m-DTPA aerosol and intravenous administration of 4.4 mCi of Tc99m-MAA, a ventilation scan followed by perfusion lung scan were obtained. Findings: Ventilation scan shows no ventilation defect. Perfusion scan shows normal pulmonary perfusion. There are no segmental perfusion defects.     X-RAY CHEST 1 VIEW    Result Date: 11/27/2021  CLINICAL HISTORY: Shortness of breath COMPARISON:Chest x-ray 4/1/2018 COMMENT: Single view of the chest is obtained. There is no pneumothorax, pleural effusion or focal consolidation. The cardiopericardial silhouette is normal in size. Overlying soft tissues are normal.     IMPRESSION: No acute pulmonary abnormality.     US venous leg bilateral    Result Date: 11/27/2021  CLINICAL HISTORY: LE edema COMMENT: Ultrasound examination of the bilateral lower extremity venous system is performed utilizing gray scale, color, and pulsed Doppler sonography. Comparison: There are no prior examinations available for comparison. Limited study. There is normal response to compression within the bilateral common femoral, superficial femoral, and popliteal veins. The calf veins are suboptimally visualized.     IMPRESSION: No evidence for bilateral deep venous thrombosis in the visualized veins.    Transthoracic echo (TTE) complete    Result Date: 11/29/2021  · Normal-sized LV. Preserved LV systolic function. Estimated EF 60- 65%. Normal LV septal wall motion. Wall motion appears grossly normal. Normal LV wall thickness. Grade I LV diastolic dysfunction. Normal left atrial pressure. · Normal-sized RV. Normal RV systolic function. · Aortic root normal. Sinuses of Valsalva normal-sized. · Trace mitral valve regurgitation. · Tricuspid valve structure is grossly normal. Trace tricuspid valve regurgitation. · Estimated RVSP = 25 mmHg. · IVC is a small caliber vessel (<1.7cm).            Assessment/Plan     In  summary this is an 80yo M with PMH of metastatic RCC with new onset of few days of worsening, painless proximal left lower extremity weakness and ambulatory dysfunction.  CVA work-up was negative for acute intracranial (stroke v. mass lesion) cause for lower extremity weakness.  MRI of the lumbar spine was obtained.     This shows multilevel degenerative disc disease in the setting of DISH with autofusion across the majority of his thoracic and lumbar segments.  He does not have spinal mets that we can identify on this study, compression of the thecal sac, or severe stenosis to explain the abrupt onset of his weakness.  He does have lateral recess stenosis at L1-2 and L2-3 causing compression of the traversing L2 and L3 nerve roots. There is a foraminal component at L1-2 as well causing compression of the exiting L1 nerve root (left worse than right) but this appears all to be chronic.  In the setting of fusion across these disc spaces it is very unlikely his weakness is caused from this lumbar spinal pathology. There is no role for surgical decompression in this setting.     On examination he does have proximal LE weakness but we cannot identify the cause as of yet. He has sudden onset of right foot and ankle pain with history of gout. There is no significant compression of the L5 or S1 nerve roots (where has been autofused for years) or a radicular quality to his pain to explain this.  Question infectious cause of foot pain v. Gout v. or fracture?     We may need to obtain an EMG in the following weeks to look for a peripheral cause.     Code Status: Full Code    VICKIE Early  11/30/2021 7:34 PM

## 2021-12-01 NOTE — PLAN OF CARE
Problem: Adult Inpatient Plan of Care  Goal: Plan of Care Review  Flowsheets (Taken 12/1/2021 1317)  Progress: no change  Plan of Care Reviewed With: patient     SW received call from pt's dtr, Ivette, to f/u on dcp. Ivette escorted out of the hospital this afternoon and lost visitation rights for the day after code green was called for yelling at staff in the hallway. Ivette requested update on status w/ [facility] determination. Per PAC/Aly, [facility] has concerns regarding pt's living arrangements after d/ch from SNF and would like to further f/u w/ pt's dtr, Ivette. Ivette aware and agreeable to speak w/ Admissions at [facility] regarding aftercare planning. Ivette appreciative of update. SW will continue to follow for dispo planning

## 2021-12-01 NOTE — ASSESSMENT & PLAN NOTE
Complaining of right foot/ankle pain but the pain seems to be mostly located in the first MTP  X-rays without fracture  Restarted colchicine December 1  Given a dose of Solu-Medrol December 1  Received 40 mg of prednisone December 2 and 3, will decrease to 30 mg December 4 - taper it next 4-5 days  Continue Tylenol around-the-clock    Improving  Monitor

## 2021-12-01 NOTE — PROGRESS NOTES
Patient: Marshall Garrison  Location: [facility] [address]  MRN: [id number]  Today's date: 12/1/2021    Attempted to see patient for therapy. Unable due to patient refused.     Second attempt made, pt declining due to elevated pain levels

## 2021-12-02 PROBLEM — D69.6 THROMBOCYTOPENIA (CMS/HCC): Status: RESOLVED | Noted: 2021-01-01 | Resolved: 2021-01-01

## 2021-12-02 NOTE — PROGRESS NOTES
Patient: Marshall Garrison  Location:  [facility] 3 Main 0335  MRN:  [id number]  Today's date:  12/2/2021     Pt OOB to chair; alarm set, call cord and items in reach; dtr present and sppt'v; RN aware.    Marshall is a 81 y.o. male admitted on 11/27/2021 with Shortness of breath [R06.02]  Left leg weakness [R29.898]. Principal problem is Left leg weakness.    Past Medical History  Marshall has a past medical history of Gout, Hypertension, and Renal cancer, right (CMS/HCC).    History of Present Illness   Admitted 11/27/21 with Shortness of breath and fatigue.  History of A. fib and on Eliquis.  Forgot Eliquis x2 days as he lost his prescription.  Right nephrectomy.  History of cancer.  Leg swelling.    HCT 11/27 (-), CXR (-), MRI without acute infarct      PT Vitals    Date/Time Pulse HR Source Pt Activity O2 Therapy BP BP Location BP Method Pt Position Observations [facility]   12/02/21 1203 73 Monitor At rest None (Room air) 141/70 Left upper arm Automatic Lying PT/OT MM      PT Pain    Date/Time Pain Type Location Rating: Rest Rating: Activity Interventions [facility]   12/02/21 1203 Pain Assessment;Post Activity leg 3 6 position adjusted MM          Prior Living Environment      Most Recent Value   People in Home alone   Current Living Arrangements home   Home Accessibility --  [Stall Shower]   Living Environment Comment Lives alone in in 2 story home, ? steps to enter, full flight to B&B, walk-in shower        Prior Level of Function      Most Recent Value   Dominant Hand right   Ambulation independent   Transferring independent   Toileting independent   Bathing independent   Dressing independent   Eating independent   Prior Level of Function Comment Reports living alone, plays golf, reads, walks his Brock retriever. +, independent no AD   Assistive Device Currently Used at Home cane, straight           PT Evaluation and Treatment - 12/02/21 1159        PT Time Calculation    Start Time 1159     Stop Time 1215     Time  Calculation (min) 16 min        Session Details    Document Type daily treatment/progress note     Mode of Treatment physical therapy        General Information    Patient Profile Reviewed yes     Referring Physician Jenna     Patient/Family/Caregiver Comments/Observations RN cleared, requested; joined by OT; pt's dtr present, sppt'v     General Observations of Patient in bed, agreeable to PT     Existing Precautions/Restrictions aspiration;fall;other (see comments)   O2 not <94%       Cognition/Psychosocial    Affect/Mental Status (Cognition) WFL     Orientation Status (Cognition) oriented x 4     Follows Commands (Cognition) follows two-step commands;75-90% accuracy     Comment, Cognition receptive        Bed Mobility    [place], Roll Left minimum assist (75% or more patient effort);1 person assist;verbal cues   HOB 30deg    Verbal Cues (Roll Left) preparatory posture;hand placement     [place], Supine to Sit minimum assist (75% or more patient effort);1 person assist;verbal cues;increased time to complete     Verbal Cues (Supine to Sit) preparatory posture     Assistive Device matress firmed;head of bed elevated;bed rails     Comment (Bed Mobility) OOB (L)        Transfers    Transfers stand pivot transfer        Sit to Stand Transfer    [place], Sit to Stand Transfer moderate assist (50-74% patient effort);2 person assist;verbal cues     Verbal Cues preparatory posture;hand placement;maintaining center of gravity over base of support;technique     Assistive Device walker, front-wheeled     Comment Mod A x 1 at chair w/ b/l arm rests and emphasis on posture/body mechs        Stand to Sit Transfer    [place], Stand to Sit Transfer moderate assist (50-74% patient effort);2 person assist;verbal cues     Verbal Cues preparatory posture;hand placement;safety     Assistive Device walker, front-wheeled     Comment Mod A x 1, second transfer; to chair, b/l arm rests        Stand Pivot Transfer     [place], Stand Pivot/Stand Step Transfer moderate assist (50-74% patient effort);2 person assist;increased time to complete;verbal cues;nonverbal cues (demo/gesture)     Verbal Cues maintaining center of gravity over base of support;proper use of assistive device;safety   posture, walker placement    Assistive Device walker, front-wheeled     Comment R to L, bed to chair; shuffles, flexed posture, [facility]        Gait Training    [place], Gait not tested;safety considerations     Comment (Gait/Stairs) poor posture, balance, safety; quickly fatigued        Safety Issues, Functional Mobility    Impairments Affecting Function (Mobility) strength;endurance/activity tolerance;postural/trunk control;balance        Balance    Static Sitting Balance WFL;sitting, edge of bed     Dynamic Sitting Balance mild impairment;supported     Sit to Stand Dynamic Balance moderate impairment;supported     Static Standing Balance moderate impairment;supported     Dynamic Standing Balance severe impairment;supported     Comment, Balance RW, HHA        Therapeutic Exercise    Therapeutic Exercise lower extremity        Lower Extremity (Therapeutic Exercise)    Exercise Position/Type seated;AROM (active range of motion)     General Exercise ankle pumps;LAQ (long arc quad)     Reps and Sets 12/6     Comment cue tech/form; full ROM        AM-PAC (TM) - Mobility (Current Function)    Turning from your back to your side while in a flat bed without using bedrails? 3 - A Little     Moving from lying on your back to sitting on the side of a flat bed without using bedrails? 3 - A Little     Moving to and from a bed to a chair? 2 - A Lot     Standing up from a chair using your arms? 2 - A Lot     To walk in a hospital room? 1 - Total     Climbing 3-5 steps with a railing? 1 - Total     AM-PAC (TM) Mobility Score 12        Assessment/Plan (PT)    Daily Outcome Statement [facility] mob 12.  Decr'd posture, balance, strength, mob,  [facility].  fatigues quickly but works well w/ PT.  A x 1 to EOB, A x 2 bed to chair.  Cont PT and progress as tolerated.     Rehab Potential good, to achieve stated therapy goals     Therapy Frequency 5 times/wk     Planned Therapy Interventions bed mobility training;transfer training;gait training;strengthening;postural re-education;balance training               PT Assessment/Plan      Most Recent Value   PT Recommended Discharge Disposition acute rehab/Inpatient Rehab Facility at 12/02/2021 1159   Anticipated Equipment Needs at Discharge (PT) --  [TBD] at 12/02/2021 1159   Patient/Family Therapy Goals Statement get better at 12/02/2021 1159                         PT Goals      Most Recent Value   Bed Mobility Goal 1    Activity/Assistive Device bed mobility activities, all at 11/28/2021 1500   [place] independent at 11/28/2021 1500   Time Frame by discharge at 11/28/2021 1500   Progress/Outcome goal ongoing at 11/28/2021 1500   Transfer Goal 1    Activity/Assistive Device all transfers, walker, front-wheeled at 11/28/2021 1500   [place] modified independence at 11/28/2021 1500   Time Frame by discharge at 11/28/2021 1500   Progress/Outcome goal ongoing at 11/28/2021 1500   Gait Training Goal 1    Activity/Assistive Device gait (walking locomotion), assistive device use, walker, front-wheeled at 11/28/2021 1500   [place] supervision required at 11/28/2021 1500   Distance 150 at 11/28/2021 1500   Time Frame by discharge at 11/28/2021 1500   Progress/Outcome goal ongoing at 11/28/2021 1500   Stairs Goal 1    Activity/Assistive Device stairs, all skills at 11/28/2021 1500   [place] supervision required at 11/28/2021 1500   Number of Stairs 12 at 11/28/2021 1500   Time Frame by discharge at 11/28/2021 1500   Progress/Outcome goal ongoing at 11/28/2021 1500

## 2021-12-02 NOTE — PROGRESS NOTES
Hematology/Oncology -  Daily Progress Note       SUBJECTIVE   Interval History: Patient reports feeling better today no complaints     OBJECTIVE      Vital signs in last 24 hours:  Temp:  [36.4 °C (97.5 °F)-36.7 °C (98 °F)] 36.6 °C (97.8 °F)  Heart Rate:  [61-88] 63  Resp:  [16-18] 18  BP: (122-154)/(68-89) 138/78    Intake/Output Summary (Last 24 hours) at 12/2/2021 0805  Last data filed at 12/2/2021 0346  Gross per 24 hour   Intake --   Output 600 ml   Net -600 ml       PHYSICAL EXAMINATION          Physical Examination: General appearance -in no distress  Mental status -cooperative   Chest -clear to auscultation  Heart - normal rate and regular rhythm  Abdomen - soft, nontender, nondistended, no masses or organomegaly  bowel sounds normal  Extremities - no pedal edema noted       LABS / IMAGING / TELE      Labs  Recent Results (from the past 24 hour(s))   Basic metabolic panel    Collection Time: 12/02/21  3:26 AM   Result Value Ref Range    Sodium 136 136 - 144 mEQ/L    Potassium 4.5 3.6 - 5.1 mEQ/L    Chloride 108 98 - 109 mEQ/L    CO2 16 (L) 22 - 32 mEQ/L    BUN 66 (H) 8 - 20 mg/dL    Creatinine 2.6 (H) 0.8 - 1.3 mg/dL    Glucose 131 (H) 70 - 99 mg/dL    Calcium 8.2 (L) 8.9 - 10.3 mg/dL    eGFR 23.7 (L) >=60.0 mL/min/1.73m*2    Anion Gap 12 3 - 15 mEQ/L   CBC    Collection Time: 12/02/21  3:26 AM   Result Value Ref Range    WBC 8.90 3.80 - 10.50 K/uL    RBC 3.23 (L) 4.50 - 5.80 M/uL    Hemoglobin 9.9 (L) 13.7 - 17.5 g/dL    Hematocrit 32.0 (L) 40.1 - 51.0 %    MCV 99.1 (H) 83.0 - 98.0 fL    MCH 30.7 28.0 - 33.2 pg    MCHC 30.9 (L) 32.2 - 36.5 g/dL    RDW 16.5 (H) 11.6 - 14.4 %    Platelets 179 150 - 350 K/uL    MPV 9.6 9.4 - 12.4 fL   Magnesium    Collection Time: 12/02/21  3:26 AM   Result Value Ref Range    Magnesium 2.0 1.8 - 2.5 mg/dL       Imaging  X-RAY ANKLE RIGHT 3+ VIEWS    Result Date: 12/1/2021  CLINICAL HISTORY: Pain     IMPRESSION: Limited study. Advanced degenerative changes at the first  metatarsophalangeal joint. Mild degenerative change elsewhere. Suspect calcific tendinopathy at the Achilles tendon insertion. Findings suggestive of diabetes mellitus. COMMENT: Portable 4 view studies of the right ankle and right foot were performed. The study is limited by suboptimal positioning. We have no prior studies for comparison. No fracture or dislocation is identified about the ankle. Mild irregularity about the medial and lateral malleoli are likely a mild degenerative change. The ankle joint and ankle mortise appear intact. There is calcification posterior to the calcaneus at the Achilles tendon insertion. There is a tiny plantar calcaneal spur. There are severe degenerative changes at the first metatarsophalangeal joint. Mild degenerative changes are suspected in the midfoot. There is calcification of the small vessels about the ankle and foot, consistent with advanced atherosclerotic disease, typically seen with diabetes mellitus.    X-RAY FOOT RIGHT 3+ VIEWS    Result Date: 12/1/2021  CLINICAL HISTORY: Pain     IMPRESSION: Limited study. Advanced degenerative changes at the first metatarsophalangeal joint. Mild degenerative change elsewhere. Suspect calcific tendinopathy at the Achilles tendon insertion. Findings suggestive of diabetes mellitus. COMMENT: Portable 4 view studies of the right ankle and right foot were performed. The study is limited by suboptimal positioning. We have no prior studies for comparison. No fracture or dislocation is identified about the ankle. Mild irregularity about the medial and lateral malleoli are likely a mild degenerative change. The ankle joint and ankle mortise appear intact. There is calcification posterior to the calcaneus at the Achilles tendon insertion. There is a tiny plantar calcaneal spur. There are severe degenerative changes at the first metatarsophalangeal joint. Mild degenerative changes are suspected in the midfoot. There is calcification of the small  vessels about the ankle and foot, consistent with advanced atherosclerotic disease, typically seen with diabetes mellitus.    Not applicable  ASSESSMENT AND PLAN      Renal cancer (CMS/HCC)  Assessment & Plan  81-year-old gentleman with metastatic renal cell carcinoma who is medical oncology care is at the [facility].  Recently completed a course of tivozanib.  Plans to hold it while he undergoes SBRT to the larger liver lesions.  Continue to hold the medication until he returns to see his physicians at [facility].  12/02/21 patient to have follow-up with his oncology team at [facility] after discharge.  Will sign off.  If can be of further assistance please do not hesitate to contact us.    .       Code Status: Full Code     Sloane Arredondo MD  12/2/2021  8:05 AM

## 2021-12-02 NOTE — PATIENT CARE CONFERENCE
Care Progression Rounds Note  Date: 12/2/2021  Time: 8:48 AM     Patient Name: Marshall Garrison     Medical Record Number: [id number]   YOB: 1940  Sex: Male      Room/Bed: 0335    Admitting Diagnosis: Shortness of breath [R06.02]  Left leg weakness [R29.898]   Admit Date/Time: 11/27/2021 12:51 PM    Primary Diagnosis: Left leg weakness  Principal Problem: Left leg weakness    GMLOS: pending  Anticipated Discharge Date: 12/4/2021    AM-PAC:  Mobility Score: 8    Discharge Planning:  Current Living Arrangements: home  Concerns to be Addressed: discharge planning  Anticipated Discharge Disposition: skilled nursing facility    Barriers to Discharge:  Medical issues not resolved    Comments:       Participants:  social work/services,nursing,pharmacy

## 2021-12-02 NOTE — PROGRESS NOTES
PACC RN - TC to Ashley at [facility], had to leave a VM on her office phone and also on her mobile business phone. Asked for determination as she had said she would review clinical yesterday, but neither patient's daughter or this RN heard back from her . Also asked her to call daughter regardless of determination,, provided name and phone number. Updated IP [facility] Heather of above.    ADDENDUM - [phone] - received phone text from Ashley, she said they are not able to accept patient. Updated Heather.

## 2021-12-02 NOTE — PROGRESS NOTES
1321 YON met w/ pt and his dtr, Ivette, at bedside to f/u on dcp. YON provided update that [facility] is unable to offer SNF bed at this time. Ivette requested to f/u w/ Admissions/[facility] and was provided w/ contact info. YON provided Ivette w/ Medicare SNF list for further review of SNF choices. Ivette and pt agreeable for YON to make referral to [facility]. PAC/Aly aware and referral made - pending review

## 2021-12-02 NOTE — PROGRESS NOTES
Hospital Medicine Service -  Daily Progress Note       SUBJECTIVE   More lethargic as per RN  Easily arousable, appropriate  Reports he is tired, not sure why  Right foot/ankle pain is definitely better than yesterday   OBJECTIVE      Vital signs in last 24 hours:  Temp:  [36.4 °C (97.5 °F)-36.7 °C (98 °F)] 36.6 °C (97.8 °F)  Heart Rate:  [61-88] 63  Resp:  [16-18] 18  BP: (122-154)/(68-89) 138/78    Intake/Output Summary (Last 24 hours) at 12/2/2021 0855  Last data filed at 12/2/2021 0346  Gross per 24 hour   Intake --   Output 600 ml   Net -600 ml       PHYSICAL EXAMINATION      Physical Exam  Vitals and nursing note reviewed.   Constitutional:       Appearance: Normal appearance. He is well-developed and normal weight.   Eyes:      General: No scleral icterus.  Cardiovascular:      Rate and Rhythm: Normal rate and regular rhythm.      Heart sounds: Normal heart sounds.   Pulmonary:      Effort: Pulmonary effort is normal.      Breath sounds: Normal breath sounds.   Abdominal:      General: Bowel sounds are normal.      Palpations: Abdomen is soft. There is no mass.      Tenderness: There is no abdominal tenderness.   Musculoskeletal:         General: Swelling present.      Cervical back: Neck supple.      Comments: Mild swelling of right ankle, foot   Neurological:      Mental Status: He is alert and oriented to person, place, and time.   Psychiatric:         Behavior: Behavior normal.        LABS / IMAGING / TELE      Labs  I have reviewed the patient's labs.  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 2. Edema in the paraspinal muscles, most pronounced from L3 through S1.    Discussed with Dr. Verduzco November 30: Left leg weakness is  likely secondary to lumbar radiculopathy.  We will get neurosurgery consult.  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  + mild to moderate L sided stenosis at L1-2 and L2-3 that is likely chronic given he is fused across those segments (arthritis will not progress once fused). + painless leg symptoms that I do no believe are due to this chronic stenosis. No NSGY intervention.  Delayed EMG (3-4 weeks of weakness before EMG studies tend to show positive findings)    PT noted  Will need SNF        Acute gout  Assessment & Plan  Complaining of right foot/ankle pain but the pain seems to be mostly located in the first MTP  X-rays without fracture  Restarted colchicine December 1  Given a dose of Solu-Medrol December 1  We will start prednisone  Continue Tylenol around-the-clock    Improving  Monitor    Hypothyroidism  Assessment & Plan  TSH is 16  Increase levothyroxine    Acute kidney injury superimposed on chronic kidney disease (CMS/HCC)  Assessment & Plan  Baseline creatinine 2-2.2  Creatinine is up to 2.7 November 30  Creatinine improved with IV fluid but again up after fluids were discontinued  Will restart IV fluid  Encourage oral fluid intake  Hold Lasix      Hypertension  Assessment & Plan  On Norvasc and Lasix at home.  Blood pressure is elevated.  Continue Norvasc, increased to 10 mg  Hold Lasix  Started on hydralazine    Blood pressure is controlled      Paroxysmal A-fib (CMS/HCC)  Assessment & Plan  In sinus rhythm  Cont Eliquis  Echocardiogram: Ejection fraction 60 to 65%, no wall motion abnormality, no significant valvular heart disease     Renal cancer (CMS/HCC)  Assessment & Plan  S/P right nephrectomy and right adrenalectomy & IVC thrombectomy in 2013  With metastasis in lung, liver, kidney bed  Status post multiple treatments  Status post SBRT to kidney bed in 2018 and to lungs in 2019  The plan is for liver SBRT  On Tivozanib  started on 11/03/2021 (completed 21 days and off 7 days), currently off it -gets treatment at [facility]    Oncology is following    SOB (shortness of breath) on exertion  Assessment & Plan  Saturating well in RA  VQ scan: Low probability for pulmonary embolism  Chest x-ray: No acute pulmonary disease    Monitor         VTE Assessment: [place]    Code Status: Full Code  Estimated discharge date: 12/4/2021     Tabitha West MD  12/2/2021

## 2021-12-02 NOTE — PROGRESS NOTES
OCCUPATIONAL THERAPY ACUTE CARE - TREATMENT NOTE     Patient:  Marshall Garrison  Location:  [address]  MRN:  [id number]  Today's date:  2021    Marshall is a 81 y.o. male admitted on 2021 with Shortness of breath [R06.02]  Left leg weakness [R29.898]. Principal problem is Left leg weakness.    Past Medical History  Marshall has a past medical history of Gout, Hypertension, and Renal cancer, right (CMS/HCC).    History of Present Illness   Admitted 21 with Shortness of breath and fatigue.  History of A. fib and on Eliquis.  Forgot Eliquis x2 days as he lost his prescription.  Right nephrectomy.  History of cancer.  Leg swelling.    HCT  (-), CXR (-), MRI without acute infarct      Session details: daily treatment/progress note   occupational therapy    Start time:   1200  End time:  1216  Time ca min    General Observations  Start: Pt received supine in bed; he was agreeable to therapy and no issues or concerns identified by nurse prior to session   End: Pt reclined in chair at end of session; call bell in reach, posey chair alarm activated, all needs met and pt in NAD    Precautions:   aspiration and fall  Limitations: safety/cognition    VITALS     OT Vitals    Date/Time Pulse HR Source Pt Activity O2 Therapy BP BP Location BP Method Pt Position Observations [facility]   21 1203 73 Monitor At rest None (Room air) 141/70 Left upper arm Automatic Lying PT/OT MM      OT Pain    Date/Time Pain Type Location Rating: Rest Rating: Activity Interventions [facility]   21 1203 Pain Assessment;Post Activity leg 3 6 position adjusted MM          PRIOR LEVEL OF FUNCTION AND LIVING ENVIRONMENT     Prior Level of Function      Most Recent Value   Dominant Hand right   Ambulation independent   Transferring independent   Toileting independent   Bathing independent   Dressing independent   Eating independent   Prior Level of Function Comment Reports living alone, plays golf, reads, walks his  "Brock retriever. +, independent no AD   Assistive Device Currently Used at Home cane, straight          Prior Living Environment      Most Recent Value   People in Home alone   Current Living Arrangements home   Home Accessibility --  [Stall Shower]   Living Environment Comment Lives alone in in 2 story home, ? steps to enter, full flight to B&B, walk-in shower          Occupational Profile      Most Recent Value   Reason for Services/Referral Diminished Indep in BADL and Mobility   Successful Occupations Retired from [employer]   Occupational History/Life Experiences Father of 1 Daughter / Enjoys Golfing   Performance Patterns PLOF Indep in BADL and Mobility w/o AD   Environmental Supports and Barriers Resides Alone with Dog   Patient Goals \"That's it for today, I'll need to lay back down, can you help me.\"          OBJECTIVE     Cognition   Affect/MentalStatus: WFL  Orientation: oriented x 4  Follow Commands: follows one step commands 75-90% of the time and with demonstrated repetition of directions required and verbal cues/prompting required  Cognitive Function: safety deficit: moderate deficit (safety precautions awareness and safety precautions follow-through)    Motor Skills: functional activity tolerance/endurance (Fair)    Balance   Static Dynamic   Sitting Fair (maintains balance unsupported without LOB and without UE support) Fair - (CGA; unable to weight shift without UE support)   Standing Poor (mod A to maintain balance) Poor (mod A to maintain balance or right self; unable to weight shift or cross midline)   General Comments:      Functional Transfers   [place] Level DME / AD Cues / Comments   Supine to Sit minimum assist and 1 person assist draw sheet and head of bed elevated left   Bed to/from Chair moderate assist and 2 person assist walker (front-wheeled) SPT to left side    Sit to Stand moderate assist and 1 person assist walker (front-wheeled) From bed   Stand to " Sit moderate assist and 1 person assist walker (front-wheeled) To chair        Activities of Daily Living   [place] Level Cues / DME / Comments   Dressing: Upper Body                      Lower Body      dependent Bilateral socks, seated   Grooming/hygiene minimum assist MIN A for stability while seated EOB   Toileting dependent Required total assist for hygiene for toielting        AM-PAC ™ - ADL (Current Function)     Putting on/taking off regular LB clothing 1 - Total   Bathing 2 - A Lot   Toileting 1 - Total   Putting on/taking off regular UB clothing 2 - A Lot   Help for taking care of personal grooming 3 - A Little   Eating meals 3 - A Little   AM-PAC ™ ADL Score 12       ASSESSMENT AND RECOMMENDATIONS     Progress Summary  OT treatment session complete. ADL [facility] [address] 12 . Patient continues to present with functional limitations affecting areas of ADL’s and functional transfers. Pt making good progress toward all goals. Currently, patient performs bed mobility MIN A , MOD A X2 assist SPT using RW, total assist for LB dressing/ toileting, and MIN A for grooming. Pt limited by pain,  and decrease strength.  Pt would benefit from continued skilled OT services to maximize safety and independence with daily tasks. Recommend discharge to SNF when medically stable.    Therapy Plan  Rehab potential:  good, to achieve stated therapy goals  Therapy frequency:  3-5 times/wk  Therapy interventions:  BADL retraining,occupation/activity based interventions,transfer/mobility retraining,activity tolerance training    Discharge Plan  Recommended discharge:  skilled nursing facility  Anticipated equipment needs:  other (see comments) (to be determined at next LOC)    Patient/Family Therapy Goal Statement: Go to a rehab I guess I'll have to    OT Goals      Most Recent Value   Transfer Goal 1    Activity/Assistive Device sit-to-stand/stand-to-sit, bed-to-chair/chair-to-bed, toilet, shower at 11/28/2021 1012   [place]  modified independence at 11/28/2021 1012   Time Frame by discharge at 11/28/2021 1012   Progress/Outcome goal ongoing at 11/28/2021 1012   Bathing Goal 1    Activity/Assistive Device bathing skills, all at 11/28/2021 1012   Time Frame by discharge at 11/28/2021 1012   Progress/Outcome goal ongoing at 11/28/2021 1012   Dressing Goal 1    Activity/Adaptive Equipment dressing skills, all at 11/28/2021 1012   [place] modified independence at 11/28/2021 1012   Time Frame by discharge at 11/28/2021 1012   Progress/Outcome goal ongoing at 11/28/2021 1012   Toileting Goal 1    Activity/Assistive Device toileting skills, all at 11/28/2021 1012   [place] modified independence at 11/28/2021 1012   Time Frame by discharge at 11/28/2021 1012   Progress/Outcome goal ongoing at 11/28/2021 1012

## 2021-12-03 PROBLEM — R33.9 URINARY RETENTION: Status: ACTIVE | Noted: 2021-01-01

## 2021-12-03 NOTE — CONSULTS
Subjective      Marshall Garrison is a 81 y.o. male who was admitted for Shortness of breath [R06.02]  Left leg weakness [R29.898]. Patient was referred by Laxmi Agosto for management recommendations. Patient is An 81-year-old male with Lower extremity weakness over the last several days.  Has past medical history as detailed below.We began consult it primarily for urinary retention.Patient reports no prior history of difficulty emptying his bladder, no prior difficulty with urinary retention episodes or urinary tract infections.  Patient seems like a reliable historian.  Here in the hospital, elevated postvoid residuals have been identified and he has been tolerating and are making catheterization without difficulty.            .    Medical History:   Past Medical History:   Diagnosis Date   • Gout    • Hypertension    • Renal cancer, right (CMS/HCC)        Surgical History:   Past Surgical History:   Procedure Laterality Date   • NEPHRECTOMY Right    • PROSTATECTOMY         Social History:   Social History     Social History Narrative   • Not on file       Family History: History reviewed. No pertinent family history.    Allergies: No known drug allergies    Current Facility-Administered Medications   Medication Dose Route Frequency Provider Last Rate Last Admin   • acetaminophen (TYLENOL) tablet 650 mg  650 mg oral q8h JOSÉ Tabitha West MD   650 mg at 12/03/21 0603   • amLODIPine (NORVASC) tablet 10 mg  10 mg oral Daily Jia Borja MD   10 mg at 12/03/21 0952   • apixaban (ELIQUIS) tablet 2.5 mg  2.5 mg oral BID Tabitha West MD   2.5 mg at 12/03/21 0952   • colchicine (COLCRYS) tablet 0.6 mg  0.6 mg oral Daily Tabitha West MD   0.6 mg at 12/03/21 0952   • glucose chewable tablet 16-32 g of dextrose  16-32 g of dextrose oral PRN Jia Borja MD        Or   • dextrose 40 % oral gel 15-30 g of dextrose  15-30 g of dextrose oral PRJia Schaffer MD        Or   • glucagon (GLUCAGEN) injection 1 mg  1  mg intramuscular PRN Jia Bojra MD        Or   • dextrose in water injection 12.5 g  25 mL intravenous PRN Jia Borja MD       • [Provider Managed Hold] furosemide (LASIX) tablet 20 mg  20 mg oral Daily Jia Borja MD   20 mg at 11/29/21 0959   • hydrALAZINE (APRESOLINE) injection 10 mg  10 mg intravenous q6h PRN Jia Borja MD   10 mg at 11/27/21 1804   • hydrALAZINE (APRESOLINE) tablet 25 mg  25 mg oral q8h JOSÉ Tabitha West MD   25 mg at 12/03/21 0603   • levothyroxine (SYNTHROID) tablet 37.5 mcg  37.5 mcg oral Daily (6:30a) Tabitha West MD   37.5 mcg at 12/03/21 0603   • mirtazapine (REMERON) tablet 15 mg  15 mg oral Nightly Jia Borja MD   15 mg at 12/02/21 2124   • [Provider Managed Hold] oxybutynin (DITROPAN) tablet 5 mg  5 mg oral Daily Jia Borja MD   5 mg at 11/30/21 0839   • pantoprazole (PROTONIX) tablet,delayed release (DR/EC) 40 mg  40 mg oral Daily Tabitha West MD   40 mg at 12/03/21 0952   • polyethylene glycol (MIRALAX) 17 gram packet 17 g  17 g oral Daily Tabitha West MD   17 g at 12/03/21 0953   • predniSONE (DELTASONE) tablet 40 mg  40 mg oral Daily Tabitha West MD   40 mg at 12/03/21 0952   • sodium chloride 0.9 % infusion   intravenous Continuous Tabitha West MD 80 mL/hr at 12/03/21 0952 New Bag at 12/03/21 0952   • tamsulosin (FLOMAX) 24 hr ER capsule 0.4 mg  0.4 mg oral Daily Tabitha West MD   0.4 mg at 12/03/21 0952        Medication List      ASK your doctor about these medications    amLODIPine 10 mg tablet  Commonly known as: NORVASC  Take 5 mg by mouth daily.  Dose: 5 mg     apixaban 2.5 mg tablet  Commonly known as: ELIQUIS  Take 2.5 mg by mouth 2 times daily.  Dose: 2.5 mg     colchicine 0.6 mg tablet  Commonly known as: COLCRYS     furosemide 20 mg tablet  Commonly known as: LASIX     levothyroxine 25 mcg tablet  Commonly known as: SYNTHROID  Take 25 mcg by mouth daily.  Dose: 25 mcg     LORazepam 0.5 mg tablet  Commonly known as:  ATIVAN  Take 0.5 mg by mouth every 6 hours as needed.  Dose: 0.5 mg     mirtazapine 15 mg tablet  Commonly known as: REMERON  Take 15 mg by mouth nightly.  Dose: 15 mg     oxybutynin 5 mg tablet  Commonly known as: DITROPAN  Take 5 mg by mouth daily.  Dose: 5 mg     TIVOZANIB ORAL  Take by mouth.          Review of Systems  All other systems reviewed and negative except as noted in the HPI.    Objective   Labs  Reviewed    Imaging        Physicial Exam  GENERAL: NAD; Patient is well nourished and appears appropriate for age; riented to person, place, and time  HEENT: Normocephalic and atraumatic; extraocular muscles grossly intact; neck supple with no lymphadenopathy; Mucous membranes are normal  CARDIOVASCULAR: No JVD; limbs appear well perfused  CHEST: No tachypnea; no dyspnea; no accessory muscle use; no auditory wheezes  ABDOMEN: S/NT/ND; no masses; no rebound; no guarding; no CVA TTP  GENITAL: Normal for age; penis and scrotum normal; testicles bilaterally descende and not tender to palpation; prostate exam deferred  EXTREMITES: No clubbing, cyanosis or edema  NEURO: No focal defecits  SKIN: Skin is warm and dry; no rashes  PSYCH: Pleasant; cooperative; normal mood and affect. His speech is normal and behavior is normal.                 Assessment   81 y.o. male being consulted for management recommendations       Plan     Continue Flomax as you are - may take several days to have any impact  Monitor PVRs carefully and CIC for elevated volumes - orders placed  May require Cox placement  Management of RCC with primary urologist/oncologist  Will follow

## 2021-12-03 NOTE — PROGRESS NOTES
Hospital Medicine Service -  Daily Progress Note       SUBJECTIVE   Right ankle/foot pain continues to improve  + urinary retention, was straight cathed for 900 cc and 450 cc  No chest pain, SOB, dizziness, lightheadedness   OBJECTIVE      Vital signs in last 24 hours:  Temp:  [36.4 °C (97.6 °F)-36.7 °C (98.1 °F)] 36.7 °C (98.1 °F)  Heart Rate:  [65-71] 70  Resp:  [18] 18  BP: (117-142)/(68-78) 142/77    Intake/Output Summary (Last 24 hours) at 12/3/2021 1454  Last data filed at 12/3/2021 1211  Gross per 24 hour   Intake --   Output 2150 ml   Net -2150 ml       PHYSICAL EXAMINATION      Physical Exam  Vitals and nursing note reviewed.   Constitutional:       Appearance: Normal appearance. He is well-developed and normal weight.   Eyes:      General: No scleral icterus.  Cardiovascular:      Rate and Rhythm: Normal rate and regular rhythm.      Heart sounds: Normal heart sounds.   Pulmonary:      Effort: Pulmonary effort is normal.      Breath sounds: Normal breath sounds.   Abdominal:      General: Bowel sounds are normal.      Palpations: Abdomen is soft. There is no mass.      Tenderness: There is no abdominal tenderness.   Musculoskeletal:         General: Swelling present.      Cervical back: Neck supple.      Comments: Improving right ankle, foot swelling   Neurological:      Mental Status: He is alert and oriented to person, place, and time.   Psychiatric:         Behavior: Behavior normal.        LABS / IMAGING / TELE      Labs  I have reviewed the patient's labs.  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 2. Edema in the paraspinal muscles, most pronounced from L3 through S1.    Discussed with Dr. Verduzco November 30: Left leg weakness  is likely secondary to lumbar radiculopathy.  We will get neurosurgery consult.  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  + mild to moderate L sided stenosis at L1-2 and L2-3 that is likely chronic given he is fused across those segments (arthritis will not progress once fused). + painless leg symptoms that I do no believe are due to this chronic stenosis. No NSGY intervention.  Delayed EMG (3-4 weeks of weakness before EMG studies tend to show positive findings)    PT noted  Will need SNF        Urinary retention  Assessment & Plan  Likely secondary to decreased mobility, pain  Urine analysis is without pyuria  Oxybutynin has been held since December 1    Started on Flomax  Continue bladder scan every shift, straight cath as needed  Urology consult is appreciated    Acute gout  Assessment & Plan  Complaining of right foot/ankle pain but the pain seems to be mostly located in the first MTP  X-rays without fracture  Restarted colchicine December 1  Given a dose of Solu-Medrol December 1  Received 40 mg of prednisone December 2 and 3, will decrease to 30 mg December 4   Continue Tylenol around-the-clock    Improving  Monitor    Hypothyroidism  Assessment & Plan  TSH is 16  Increase levothyroxine to 37.5 mcg    Acute kidney injury superimposed on chronic kidney disease (CMS/HCC)  Assessment & Plan  Baseline creatinine 2-2.2  Creatinine is up to 2.7 November 30  Creatinine improved with IV fluid but again up after fluids were discontinued December 1  Restarted on IV fluid December 2  Encourage oral fluid intake  Hold Lasix    Also + urinary retention managed with straight catheterization as below    BUN is increased likely secondary to steroids    Continue monitoring      Hypertension  Assessment & Plan  On Norvasc and Lasix at home.  Blood pressure is elevated.  Continue Norvasc, increased to 10 mg  Hold Lasix  Started on hydralazine    Blood  pressure is controlled      Paroxysmal A-fib (CMS/HCC)  Assessment & Plan  In sinus rhythm  Cont Eliquis  Echocardiogram: Ejection fraction 60 to 65%, no wall motion abnormality, no significant valvular heart disease     Renal cancer (CMS/HCC)  Assessment & Plan  S/P right nephrectomy and right adrenalectomy & IVC thrombectomy in 2013  With metastasis in lung, liver, kidney bed  Status post multiple treatments  Status post SBRT to kidney bed in 2018 and to lungs in 2019  The plan is for liver SBRT  On Tivozanib started on 11/03/2021 (completed 21 days and off 7 days), currently off it -gets [facility]    Oncology is following    SOB (shortness of breath) on exertion  Assessment & Plan  Saturating well on RA  VQ scan: Low probability for pulmonary embolism  Chest x-ray: No acute pulmonary disease    Monitor       Updated daughter Ivette at bedside  VTE Assessment: [place]    Code Status: Full Code  Estimated discharge date: 12/4/2021     Tabitha West MD  12/3/2021

## 2021-12-03 NOTE — PROGRESS NOTES
Patient: Marshall Garrison  Location:  [facility] 3 Main 0335  MRN:  [id number]  Today's date:  12/3/2021     Pt cleared for PT by RN. Pt left in bed, HOB elevated, bed alarm on, rails up as requested. RN notified post-session. Needs in reach/concerns addressed    Marshall is a 81 y.o. male admitted on 11/27/2021 with Shortness of breath [R06.02]  Left leg weakness [R29.898]. Principal problem is Left leg weakness.    Past Medical History  Marshall has a past medical history of Gout, Hypertension, and Renal cancer, right (CMS/HCC).    History of Present Illness   Admitted 11/27/21 with Shortness of breath and fatigue.  History of A. fib and on Eliquis.  Forgot Eliquis x2 days as he lost his prescription.  Right nephrectomy.  History of cancer.  Leg swelling.    HCT 11/27 (-), CXR (-), MRI without acute infarct      PT Vitals    Date/Time Pulse HR Source Pt Activity O2 Therapy BP BP Location BP Method Pt Position [facility]   12/03/21 1451 70 Monitor At rest None (Room air) 132/68 Right upper arm Automatic Lying LF      PT Pain    Date/Time Pain Type Rating: Rest Rating: Activity [facility]   12/03/21 1451 Pain Assessment 0 - no pain 0 - no pain    12/03/21 1500 Pain Assessment 0 - no pain 0 - no pain CA          Prior Living Environment      Most Recent Value   People in Home alone   Current Living Arrangements home   Home Accessibility --  [Stall Shower]   Living Environment Comment Lives alone in in 2 story home, ? steps to enter, full flight to B&B, walk-in shower        Prior Level of Function      Most Recent Value   Dominant Hand right   Ambulation independent   Transferring independent   Toileting independent   Bathing independent   Dressing independent   Eating independent   Prior Level of Function Comment Reports living alone, plays golf, reads, walks his Brock retriever. +, independent no AD   Assistive Device Currently Used at Home cane, straight           PT Evaluation and Treatment - 12/03/21 1451        PT  Time Calculation    Start Time 1451     Stop Time 1505     Time Calculation (min) 14 min        Session Details    Document Type daily treatment/progress note     Mode of Treatment physical therapy        General Information    Patient Profile Reviewed yes     Onset of Illness/Injury or Date of Surgery 11/27/21     Referring Physician Jenna     Patient/Family/Caregiver Comments/Observations RN cleared for PT session     General Observations of Patient Pt received awake in bed, agreeable to therapy with motivation     Existing Precautions/Restrictions fall;aspiration     Limitations/Impairments safety/cognitive        Cognition/Psychosocial    Affect/Mental Status (Cognition) flat/blunted affect     Behavioral Issues (Cognition) withdrawn     Orientation Status (Cognition) oriented x 3     Follows Commands (Cognition) follows one-step commands;over 90% accuracy     Cognitive Function attention deficit;executive function deficit     Attention Deficit (Cognition) minimal deficit;focused/sustained attention;arousal/alertness     Executive Function Deficit (Cognition) minimal deficit;insight/awareness of deficits;planning/decision-making;abstract thinking;judgment     Comment, Cognition encouragement for participation, refusing OOB to chair multiple times        Bed Mobility    [place], Supine to Sit minimum assist (75% or more patient effort);1 person assist;verbal cues     Verbal Cues (Supine to Sit) hand placement;safety;technique     [place], Sit to Supine minimum assist (75% or more patient effort);1 person assist;verbal cues     Verbal Cues (Sit to Supine) hand placement;safety;technique     Assistive Device head of bed elevated;bed rails     Comment (Bed Mobility) HOB elevated, OOB to the L        Transfers    Comment pt refusing OOB to chair        Sit to Stand Transfer    [place], Sit to Stand Transfer maximum assist (25-49% patient effort);1 person assist;verbal cues     Verbal Cues hand  placement;safety     Assistive Device walker, front-wheeled     Comment from EOB, cues for hand placement with minimal carryover        Stand to Sit Transfer    [place], Stand to Sit Transfer maximum assist (25-49% patient effort);1 person assist;verbal cues     Verbal Cues hand placement;safety;technique     Assistive Device walker, front-wheeled     Comment to bedside, poor eccentric control        Gait Training    [place], Gait 1 person assist;verbal cues;maximum assist (25-49% patient effort)     Assistive Device walker, front-wheeled     Distance in Feet 2 feet     Pattern (Gait) step-to     Deviations/Abnormal Patterns (Gait) base of support, wide;gait speed decreased;step length decreased     Comment (Gait/Stairs) significant WBOS, weight-bearing through heels, decreased heel strike on IC, bilateral knee flexion throughout stance        Safety Issues, Functional Mobility    Safety Issues Affecting Function (Mobility) insight into deficits/self-awareness;problem-solving;sequencing abilities     Impairments Affecting Function (Mobility) balance;cognition;endurance/activity tolerance;strength;postural/trunk control     Cognitive Impairments, Mobility Safety/Performance problem-solving/reasoning;insight into deficits/self-awareness;attention     Comment, Safety Issues/Impairments (Mobility) decreased activity tolerance        Balance    Balance Assessment sitting static balance;sitting dynamic balance;standing static balance;standing dynamic balance;sit to stand dynamic balance     Static Sitting Balance WFL;unsupported;sitting, edge of bed     Dynamic Sitting Balance mild impairment;unsupported;sitting, edge of bed     Sit to Stand Dynamic Balance supported;standing;severe impairment     Static Standing Balance moderate impairment;supported;standing     Dynamic Standing Balance supported;standing;severe impairment     Comment, Balance maxA x 1 for STS, max Ax 1 dynamic standing balance, mod A x 1  static standing balance. BUE support on RW        AM-PAC (TM) - Mobility (Current Function)    Turning from your back to your side while in a flat bed without using bedrails? 3 - A Little     Moving from lying on your back to sitting on the side of a flat bed without using bedrails? 3 - A Little     Moving to and from a bed to a chair? 2 - A Lot     Standing up from a chair using your arms? 2 - A Lot     To walk in a hospital room? 1 - Total     Climbing 3-5 steps with a railing? 1 - Total     AM-PAC (TM) Mobility Score 12        Assessment/Plan (PT)    Daily Outcome Statement Pt seen for PT follow-up session, [facility] 12. Pt requires min A bed mobility, max A x 1 STS transfers and short distance ambulation with RW. Pt requires mod A x 1 static standing. Pt would benefit from SNF upon d/c once medically stable     Rehab Potential good, to achieve stated therapy goals     Therapy Frequency 3-5 times/wk     Planned Therapy Interventions balance training;bed mobility training;gait training;patient/family education;strengthening;transfer training               PT Assessment/Plan      Most Recent Value   PT Recommended Discharge Disposition skilled nursing facility at 12/03/2021 1451   Anticipated Equipment Needs at Discharge (PT) --  [tbd next level of care] at 12/03/2021 1451   Patient/Family Therapy Goals Statement return home at 12/03/2021 1451                    Education Documentation  Risk Factors, taught by Roz Long, PT at 12/3/2021  3:55 PM.  Learner: Patient  Readiness: Acceptance  Method: Explanation  Response: Needs Reinforcement  Comment: PT role, plan of care, goals, proper hand placement with transfers          PT Goals      Most Recent Value   Bed Mobility Goal 1    Activity/Assistive Device bed mobility activities, all at 11/28/2021 1500   [place] independent at 11/28/2021 1500   Time Frame by discharge at 11/28/2021 1500   Progress/Outcome goal ongoing at 12/03/2021 1451   Transfer Goal 1     Activity/Assistive Device all transfers, walker, front-wheeled at 11/28/2021 1500   [facility] modified independence at 11/28/2021 1500   Time Frame by discharge at 11/28/2021 1500   Progress/Outcome goal ongoing at 12/03/2021 1451   Gait Training Goal 1    Activity/Assistive Device gait (walking locomotion), assistive device use, walker, front-wheeled at 11/28/2021 1500   [facility] supervision required at 11/28/2021 1500   Distance 150 at 11/28/2021 1500   Time Frame by discharge at 11/28/2021 1500   Progress/Outcome goal ongoing at 12/03/2021 1451   Stairs Goal 1    Activity/Assistive Device stairs, all skills at 11/28/2021 1500   [facility] supervision required at 11/28/2021 1500   Number of Stairs 12 at 11/28/2021 1500   Time Frame by discharge at 11/28/2021 1500   Progress/Outcome goal ongoing at 12/03/2021 1451

## 2021-12-03 NOTE — PROGRESS NOTES
12/3/2021  SW received call from RN that patient's daughter is present at hospital and wants to speak to CC for an update regarding discharge plans.     SW reviewed previous note from CC, that [facility] is reviewing patient for placement. SW will follow up with [facility] before speaking to daughter.    SW contacted [facility] they are interested to accept patient but need a note from oncology that patient will stop chemo treatment at [facility] during short term rehab stay at facility. Patient can bring home medications for chemo to SNF but patient won't be able to go out to [facility] for chemo treatment. Apparently, the oncology note from yesterday implied that patient will follow up with [facility] once discharged from hospital but note needs to state not while at SNF.     YON contacted oncology department at [facility] apparently providers are in a meeting at the present moment, SW directed to leave a message and someone from team will contact back ASAP. SW will update daughter. Piedad Buckley MSW    ADDENDUM 2:03 PM  YON heard back from oncology department there isn't anyone that is available to adjust note and either way oncology has signed off on this patient and made it clear that patient won't be continuing at [facility] while at SNF placement.     SW understood, will ask hospitalist to write that in the discharge instructions.     YON followed up with daughter at bedside that [facility] is willing to accept patient as long as patient understands won't be continuing at [facility] while at rehab. Daughter and patient fully understand that and already aware. Daughter really wants [facility] over [facility].     YON followed up with Aly PACC to inquire about [facility] status for today. Otherwise daughter agreeable to [facility] as the back up plan. SW to follow. Piedad Buckley MSW    ADDENDUM 3:54 PM  Aly PACC informed no beds at [facility] today or the weekend completely full.     YON updated patient's  daughter Ivette @ [phone] that [facility] expected to have bed for tomorrow and told daughter that [facility] no beds.     [facility] requesting that Attending place a note in the D/C instructions that patient will not be continuing [place] [facility]o Treatment while at SNF. Patient aware, daughter aware and no issues.     SW alerted Attending of discharge plans and facility requesting new COVID swab prior to discharge.    Daughter will get the oral medication that patient has been taking at home for chemo to bring to [facility].     Weekend CC to contact [facility] @ [phone] they will have a bed by 2 PM on Saturday. Piedad CARRERA    ADDENDUM 6:30 PM  COVID swab normal. Piedad CARRERA

## 2021-12-03 NOTE — ASSESSMENT & PLAN NOTE
Likely secondary to decreased mobility, pain  Urine analysis is without pyuria  Oxybutynin has been held since December 1    Started on Flomax  Continue bladder scan every shift, straight cath as needed- pt has been urinating on his own  Urology consult is appreciated

## 2021-12-04 NOTE — PROGRESS NOTES
Hospital Medicine Service  Daily Progress Note       SUBJECTIVE     Pt denied any foot pain or leg pain. Voided 300 per nursing. Haven;t bladder scanned yet     OBJECTIVE        Vital Signs  Temp:  [36.4 °C (97.6 °F)-36.7 °C (98.1 °F)] 36.6 °C (97.8 °F)  Heart Rate:  [] 77  Resp:  [18] 18  BP: (118-159)/(68-89) 157/78     SpO2 Readings from Last 3 Encounters:   12/04/21 96%   07/08/18 96%   04/01/18 100%       I/O last 3 completed shifts:  In: 480 [P.O.:480]  Out: 3150 [Urine:3150]    PHYSICAL EXAMINATION        GEN:; not in acute distress  HEENT: normocephalic; atraumatic    CARDIO: regular rate and rhythm;   RESP: decreased at bases  ABD: soft, , non-tender, normal bowel sounds  EXT: bilateral ankle edema present  NEURO: alert and oriented person,place; nonfocal       LABS / IMAGING / TELE        Labs  Lab Results   Component Value Date    WBC 12.30 (H) 12/04/2021    HGB 10.1 (L) 12/04/2021    HCT 32.4 (L) 12/04/2021    .0 (H) 12/04/2021     12/04/2021     Lab Results   Component Value Date    GLUCOSE 126 (H) 12/04/2021    CALCIUM 7.8 (L) 12/04/2021     12/04/2021    K 5.1 12/04/2021    CO2 15 (L) 12/04/2021     (H) 12/04/2021    BUN 92 (HH) 12/04/2021    CREATININE 2.9 (H) 12/04/2021     Lab Results   Component Value Date    INR 1.2 11/27/2021       Imaging  X-RAY ANKLE RIGHT 3+ VIEWS    Result Date: 12/1/2021  IMPRESSION: Limited study. Advanced degenerative changes at the first metatarsophalangeal joint. Mild degenerative change elsewhere. Suspect calcific tendinopathy at the Achilles tendon insertion. Findings suggestive of diabetes mellitus. COMMENT: Portable 4 view studies of the right ankle and right foot were performed. The study is limited by suboptimal positioning. We have no prior studies for comparison. No fracture or dislocation is identified about the ankle. Mild irregularity about the medial and lateral malleoli are likely a mild degenerative change. The ankle  joint and ankle mortise appear intact. There is calcification posterior to the calcaneus at the Achilles tendon insertion. There is a tiny plantar calcaneal spur. There are severe degenerative changes at the first metatarsophalangeal joint. Mild degenerative changes are suspected in the midfoot. There is calcification of the small vessels about the ankle and foot, consistent with advanced atherosclerotic disease, typically seen with diabetes mellitus.    X-RAY FOOT RIGHT 3+ VIEWS    Result Date: 12/1/2021  IMPRESSION: Limited study. Advanced degenerative changes at the first metatarsophalangeal joint. Mild degenerative change elsewhere. Suspect calcific tendinopathy at the Achilles tendon insertion. Findings suggestive of diabetes mellitus. COMMENT: Portable 4 view studies of the right ankle and right foot were performed. The study is limited by suboptimal positioning. We have no prior studies for comparison. No fracture or dislocation is identified about the ankle. Mild irregularity about the medial and lateral malleoli are likely a mild degenerative change. The ankle joint and ankle mortise appear intact. There is calcification posterior to the calcaneus at the Achilles tendon insertion. There is a tiny plantar calcaneal spur. There are severe degenerative changes at the first metatarsophalangeal joint. Mild degenerative changes are suspected in the midfoot. There is calcification of the small vessels about the ankle and foot, consistent with advanced atherosclerotic disease, typically seen with diabetes mellitus.    CT HEAD WITHOUT IV CONTRAST    Result Date: 11/27/2021  IMPRESSION: No acute intracranial process.     MRI BRAIN WITHOUT CONTRAST    Result Date: 11/28/2021  IMPRESSION: No acute infarct. No acute hemorrhage. No mass or mass effect.     MRI LUMBAR SPINE WITHOUT CONTRAST    Result Date: 11/30/2021  IMPRESSION: 1. Degenerative and discogenic disease of the lumbar spine, most pronounced at L2-L3, as  above. 2. Edema in the paraspinal muscles, most pronounced from L3 through S1. 3. Previously reported abdominal findings are incompletely imaged and therefore incompletely assessed.    NUCLEAR MEDICINE LUNG VENTILATION PERFUSION AEROSOL    Result Date: 11/29/2021  IMPRESSION: Low probability for pulmonary embolism. COMMENT: Comparison: Chest x-ray November 27, 2021. Technique: Following the inhalation of 27.5 mCi of TC99m-DTPA aerosol and intravenous administration of 4.4 mCi of Tc99m-MAA, a ventilation scan followed by perfusion lung scan were obtained. Findings: Ventilation scan shows no ventilation defect. Perfusion scan shows normal pulmonary perfusion. There are no segmental perfusion defects.     X-RAY CHEST 1 VIEW    Result Date: 11/27/2021  IMPRESSION: No acute pulmonary abnormality.     US venous leg bilateral    Result Date: 11/27/2021  IMPRESSION: No evidence for bilateral deep venous thrombosis in the visualized veins.          ASSESSMENT AND PLAN      * Left leg weakness  Assessment & Plan  MRI of the brain: No acute abnormality    MRI of lumbar spine: Degenerative and discogenic disease of the lumbar spine, most pronounced at L2-L3, as above. 2. Edema in the paraspinal muscles, most pronounced from L3 through S1.    Discussed with Dr. Verduzco November 30: Left leg weakness is likely secondary to lumbar radiculopathy.  We will get neurosurgery consult.  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  + mild to moderate L sided stenosis at L1-2 and L2-3 that is likely chronic given he is fused across those segments (arthritis will not progress once fused). + painless leg symptoms that I do no believe are due to this chronic stenosis. No NSGY intervention.  Delayed EMG (3-4 weeks of weakness before EMG studies tend to show positive findings)    PT noted  Will need SNF        Acute gout  Assessment & Plan  Complaining of right  foot/ankle pain but the pain seems to be mostly located in the first MTP  X-rays without fracture  Restarted colchicine December 1  Given a dose of Solu-Medrol December 1  Received 40 mg of prednisone December 2 and 3, will decrease to 30 mg December 4 - taper it next 4-5 days  Continue Tylenol around-the-clock    Improving  Monitor    Acute kidney injury superimposed on chronic kidney disease (CMS/HCC)  Assessment & Plan  Baseline creatinine 2-2.2  Creatinine is up to 2.7 November 30  Creatinine improved with IV fluid but again up after fluids were discontinued December 1  Restarted on IV fluid December 2-continues to have elevated creatinine- cont iv fluiids  Encourage oral fluid intake  Hold Lasix    Also + urinary retention managed with straight catheterization as below    BUN is increased likely secondary to steroids    Continue monitoring      Hypertension  Assessment & Plan  On Norvasc and Lasix at home.  Blood pressure is elevated.  Continue Norvasc, increased to 10 mg  Hold Lasix  Started on hydralazine    Blood pressure is controlled      Paroxysmal A-fib (CMS/HCC)  Assessment & Plan  In sinus rhythm  Cont Eliquis  Echocardiogram: Ejection fraction 60 to 65%, no wall motion abnormality, no significant valvular heart disease     Renal cancer (CMS/HCC)  Assessment & Plan  S/P right nephrectomy and right adrenalectomy & IVC thrombectomy in 2013  With metastasis in lung, liver, kidney bed  Status post multiple treatments  Status post SBRT to kidney bed in 2018 and to lungs in 2019  The plan is for liver SBRT  On Tivozanib started on 11/03/2021 (completed 21 days and off 7 days), currently off it -gets treatment at [facility]    Oncology is following    SOB (shortness of breath) on exertion  Assessment & Plan  Saturating well on RA  VQ scan: Low probability for pulmonary embolism  Chest x-ray: No acute pulmonary disease    Monitor      Urinary retention  Assessment & Plan  Likely secondary to  decreased mobility, pain  Urine analysis is without pyuria  Oxybutynin has been held since December 1    Started on Flomax  Continue bladder scan every shift, straight cath as needed  Urology consult is appreciated    Hypothyroidism  Assessment & Plan  TSH is 16  Increase levothyroxine to 37.5 mcg     Pt creatinine elevated. Will cont iv hydration. Discussed with patient;s daughter zainab and updated her  VTE Assessment: apixaban  Code Status: Full Code  Estimated discharge date: 12/4/2021     Ron Blanco MD  12/4/2021  8:49 AM

## 2021-12-04 NOTE — PROGRESS NOTES
"Urology Progress Note    Subjective    Interval History: Denies new complaints.  Voiding with elev PVRs.     Objective    Vital signs in last 24 hours:  Temp:  [36.4 °C (97.6 °F)-36.7 °C (98.1 °F)] 36.6 °C (97.8 °F)  Heart Rate:  [] 77  Resp:  [18] 18  BP: (118-159)/(68-89) 157/78      Intake/Output Summary (Last 24 hours) at 12/4/2021 0846  Last data filed at 12/4/2021 0831  Gross per 24 hour   Intake 4432 ml   Output 1850 ml   Net 2582 ml        Physical Exam:  Visit Vitals  BP (!) 157/78 (BP Location: Right upper arm, Patient Position: Lying)   Pulse 77   Temp 36.6 °C (97.8 °F) (Temporal)   Resp 18   Ht 1.803 m (5' 11\")   Wt 89.8 kg (198 lb)   SpO2 96%   BMI 27.62 kg/m²       General Appearance:  Alert, cooperative, no distress, appears stated age   Head:  Normocephalic, without obvious abnormality, atraumatic   Back:   No CVA tenderness   Lungs:   Respirations unlabored   Chest wall:  No tenderness or deformity       Abdomen:   Soft, non-tender, bowel sounds active all four quadrants,  no masses, no organomegaly   : No edema       Extremities:  Musculoskeletal: Extremities normal, atraumatic, no cyanosis or edema  No injury or deformity       Skin: Skin color, texture, turgor normal, no rashes or lesions     Labs  Lab Results   Component Value Date    WBC 12.30 (H) 12/04/2021    HGB 10.1 (L) 12/04/2021    HCT 32.4 (L) 12/04/2021     12/04/2021    CHOL 131 11/28/2021    TRIG 67 11/28/2021    HDL 41 (L) 11/28/2021    ALT 15 (L) 11/30/2021    AST 25 11/30/2021     12/04/2021    K 5.1 12/04/2021     (H) 12/04/2021    CREATININE 2.9 (H) 12/04/2021    BUN 92 (HH) 12/04/2021    CO2 15 (L) 12/04/2021    TSH 15.56 (H) 11/28/2021    INR 1.2 11/27/2021         Imaging  Not applicable       Assessment & Plan     Patient Active Problem List   Diagnosis   • Left leg weakness   • SOB (shortness of breath) on exertion   • Renal cancer (CMS/HCC)   • Paroxysmal A-fib (CMS/HCC)   • Hypertension   • Acute " kidney injury superimposed on chronic kidney disease (CMS/HCC)   • Hypothyroidism   • Acute gout   • Urinary retention       No new Assessment & Plan notes have been filed under this hospital service since the last note was generated.  Service: Urology        Expected Discharge Date:  12/4/2021  No discharge date for patient encounter.  Bobby Mathews MD  12/4/2021

## 2021-12-05 NOTE — PROGRESS NOTES
Pt is stable for dc to [facility]. I called addy  to let her know pt is stable.arranged ambulance transport for 2pm via [facility].gave floor nurse phone number to call report.attending aware of dc time.pt signed medicare letter.

## 2021-12-05 NOTE — PROGRESS NOTES
"Urology Progress Note    Subjective    Interval History: Feels ok.  Voiding best with standing.     Objective    Vital signs in last 24 hours:  Temp:  [36.6 °C (97.8 °F)-37.1 °C (98.7 °F)] 36.6 °C (97.8 °F)  Heart Rate:  [73-85] 77  Resp:  [18] 18  BP: (119-165)/(71-80) 165/77      Intake/Output Summary (Last 24 hours) at 12/5/2021 0838  Last data filed at 12/5/2021 0802  Gross per 24 hour   Intake 705.33 ml   Output 1750 ml   Net -1044.67 ml        Physical Exam:  Visit Vitals  BP (!) 165/77 (BP Location: Right upper arm, Patient Position: Sitting)   Pulse 77   Temp 36.6 °C (97.8 °F) (Oral)   Resp 18   Ht 1.803 m (5' 11\")   Wt 89.8 kg (198 lb)   SpO2 96%   BMI 27.62 kg/m²       General Appearance:  Alert, cooperative, no distress, appears stated age   Head:  Normocephalic, without obvious abnormality, atraumatic   Back:   No CVA tenderness   Lungs:   Respirations unlabored   Chest wall:  No tenderness or deformity       Abdomen:   Soft, non-tender, bowel sounds active all four quadrants,  no masses, no organomegaly   : No edema       Extremities:  Musculoskeletal: Extremities normal, atraumatic, no cyanosis or edema  No injury or deformity       Skin: Skin color, texture, turgor normal, no rashes or lesions     Labs  Lab Results   Component Value Date    WBC 9.95 12/05/2021    HGB 9.9 (L) 12/05/2021    HCT 31.6 (L) 12/05/2021     12/05/2021    CHOL 131 11/28/2021    TRIG 67 11/28/2021    HDL 41 (L) 11/28/2021    ALT 15 (L) 11/30/2021    AST 25 11/30/2021     12/05/2021    K 4.9 12/05/2021     (H) 12/05/2021    CREATININE 2.8 (H) 12/05/2021    BUN 93 (HH) 12/05/2021    CO2 14 (L) 12/05/2021    TSH 15.56 (H) 11/28/2021    INR 1.2 11/27/2021         Imaging  Not applicable       Assessment & Plan     Patient Active Problem List   Diagnosis   • Left leg weakness   • SOB (shortness of breath) on exertion   • Renal cancer (CMS/HCC)   • Paroxysmal A-fib (CMS/HCC)   • Hypertension   • Acute kidney " injury superimposed on chronic kidney disease (CMS/HCC)   • Hypothyroidism   • Acute gout   • Urinary retention       A/P  82 yo M, solitary kidney, incomplete but aceptable emptying, creatinine improved today.     Plan  Continue Flomax as you are   OK for d/c from  standpoint  Management of RCC with primary urologist/oncologist  Will follow       Expected Discharge Date:  12/5/2021  No discharge date for patient encounter.  Bobby Mathews MD  12/5/2021

## 2021-12-05 NOTE — NURSING NOTE
IV d/c, heart monitor removed & returned to MR. Pt belongings gathered. Escorted via [facility] to [facility]. Gave report to JUNITO Brady @ [facility]. Daughter, zainab tarango.

## 2021-12-05 NOTE — DISCHARGE INSTRUCTIONS
Due to your recent medical condition(s) it is highly suggested that you have a sleep study performed after you return home from the hospital.  Please consult with your primary care provider (PCP) and discuss if a sleep study is right for you.    Bmp on 12/7/21 and every wk. Cbc every wk    Bladder scan q shift and straight cath if >400    F/u with oncology at [facility] in 1-2wks. Please f/u with oncology if needs to go back on his chemo med    Tsh,ft4 in 6wks. synthyroid dose was increased in hospital    If continues to have lt.leg weakness consider emg in 2-3wks with neurology    On prednisone taper for gout. Will be on protonix while on prednisone    norvasc dose increased and hydralazine added secondary to elevated blood pressure    Sodium bicarb stared here in hospital for metabolic acidosis    flomax started for urinary retention in hospital    Please help patient stand up to urinate

## 2021-12-05 NOTE — DISCHARGE SUMMARY
Hospital Medicine Service -  Inpatient Discharge Summary        BRIEF OVERVIEW   Admitting Provider: Jia Borja MD  Attending Provider: No att. providers found Attending phys phone: N/A  PCP: Sloane Arredondo [phone]    Admission Date: 11/27/2021  Discharge Date: 12/5/2021     DISCHARGE DIAGNOSES      Primary Discharge Diagnosis  Left leg weakness    Secondary Discharge Diagnoses  Active Hospital Problems    Diagnosis Date Noted   • Left leg weakness 11/27/2021     Priority: High   • Acute gout 12/01/2021     Priority: Medium   • SOB (shortness of breath) on exertion 11/27/2021     Priority: Medium   • Renal cancer (CMS/HCC) 11/27/2021     Priority: Medium   • Paroxysmal A-fib (CMS/[facility]) 11/27/2021     Priority: Medium   • Hypertension 11/27/2021     Priority: Medium   • Acute kidney injury superimposed on chronic kidney disease (CMS/[facility]) 11/27/2021     Priority: Medium   • Urinary retention 12/03/2021   • Hypothyroidism 11/29/2021      Resolved Hospital Problems    Diagnosis Date Noted Date Resolved   • Thrombocytopenia (CMS/[facility]) 11/28/2021 12/02/2021     SUMMARY OF HOSPITALIZATION      Presenting Problem/History of Present Illness  Shortness of breath [R06.02]  Left leg weakness [R29.898]  81 y.o. male with a past medical history of renal cancer status post nephrectomy and now recurrence, hypertension, paroxysmal A. fib on Eliquis, CKD who presents with chief complaint of left lower extremity weakness since 3 days.  Please see history and physical done by  on 11/27/2021 for details      Hospital Course  See below.  Patient was seen by hematology oncology, neurology, neurosurgery, and urology.  Patient had MRI brain, MRI lumbar spine.  No CVA on MRI brain.  Patient had discogenic and degenerative disease most pronounced at L2/L3.  Patient was seen by neurosurgery who recommended delayed EMG in 2 to 3 weeks if continues to have left leg weakness.  No urgent surgical intervention at this  time.  Patient's colchicine was held at the time of admission and had gout pain in both his feet.  Patient was on IV Solu-Medrol changed to oral prednisone and his gout pain improved.  Patient was restarted back on colchicine and has been doing well.  Patient has chronic kidney disease and creatinine slightly elevated than his baseline at 2.8 on discharge.  Patient's baseline creatinine is around 2.2-2.5.  Advised patient to follow-up with nephrology as outpatient.  Also started on small dose of bicarb at the time of discharge.    Patient's blood pressure was elevated and Norvasc was increased and hydralazine was added    Patient had urinary retention and initially needed straight cath but after starting on Flomax patient was voiding without problems and not much residual    Discussed with patient's daughter Ivette and agreeable with discharge plan  Exam on Day of Discharge  Subjective-patient denied any complaints.  Feeling well  Vital signs stable  HEENT-NC,AT  CVS-  regular  RESPIRATORY-decreased at bases  GI-soft.non tender, bowel sounds present  EXT-bilateral ankle edema present.  Bilateral MTP of great toe did not have any erythema or swelling.  CNS-no focal deficits.  Awake alert oriented to person, place    * Left leg weakness  Assessment & Plan  MRI of the brain: No acute abnormality    MRI of lumbar spine: Degenerative and discogenic disease of the lumbar spine, most pronounced at L2-L3, as above. 2. Edema in the paraspinal muscles, most pronounced from L3 through S1.    Discussed with Dr. Verduzco November 30: Left leg weakness is likely secondary to lumbar radiculopathy.  We will get neurosurgery consult.  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  + mild to moderate L sided stenosis at L1-2 and L2-3 that is likely chronic given he is fused across those segments (arthritis will not progress once fused). + painless leg symptoms  that I do no believe are due to this chronic stenosis. No NSGY intervention.  Delayed EMG (3-4 weeks of weakness before EMG studies tend to show positive findings)    PT noted  Will need SNF        Acute gout  Assessment & Plan  Complaining of right foot/ankle pain but the pain seems to be mostly located in the first MTP  X-rays without fracture  Restarted colchicine December 1  Given a dose of Solu-Medrol December 1  Received 40 mg of prednisone December 2 and 3, will decrease to 30 mg December 4 - taper it next 4-5 days  Continue Tylenol around-the-clock    Improving  Monitor    Acute kidney injury superimposed on chronic kidney disease (CMS/HCC)  Assessment & Plan  Baseline creatinine 2.2 to 2.5  Creatinine is up to 2.7 November 30  Creatinine improved with IV fluid but again up after fluids were discontinued December 1  Restarted on IV fluid December 2-continues to have elevated creatinine- cont iv fluiids- creatinine improved - possibly new baseline  Will also start on sodium bicarb.  Encourage oral fluid intake     will restart lasix at discharge    Also + urinary retention managed with straight catheterization as below    BUN is increased likely secondary to steroids    Continue monitoring      Hypertension  Assessment & Plan  On Norvasc and Lasix at home.  Blood pressure is elevated.  Continue Norvasc, increased to 10 mg  Hold Lasix  Started on hydralazine    Blood pressure is controlled      Paroxysmal A-fib (CMS/HCC)  Assessment & Plan  In sinus rhythm  Cont Eliquis  Echocardiogram: Ejection fraction 60 to 65%, no wall motion abnormality, no significant valvular heart disease     Renal cancer (CMS/HCC)  Assessment & Plan  S/P right nephrectomy and right adrenalectomy & IVC thrombectomy in 2013  With metastasis in lung, liver, kidney bed  Status post multiple treatments  Status post SBRT to kidney bed in 2018 and to lungs in 2019  The plan is for liver SBRT  On Tivozanib started on 11/03/2021 (completed 21  days and off 7 days), currently off it -gets treatment at [facility]    Oncology is following    SOB (shortness of breath) on exertion  Assessment & Plan  Saturating well on RA  VQ scan: Low probability for pulmonary embolism  Chest x-ray: No acute pulmonary disease    Monitor      Urinary retention  Assessment & Plan  Likely secondary to decreased mobility, pain  Urine analysis is without pyuria  Oxybutynin has been held since December 1    Started on Flomax  Continue bladder scan every shift, straight cath as needed- pt has been urinating on his own  Urology consult is appreciated    Hypothyroidism  Assessment & Plan  TSH is 16  Increase levothyroxine to 37.5 mcg      Consults During Admission  IP CONSULT TO NEUROLOGY  IP CONSULT TO HEMATOLOGY/ONCOLOGY  IP CONSULT TO NEUROSURGERY  IP CONSULT TO UROLOGY    DISCHARGE MEDICATIONS      Medication List      START taking these medications    acetaminophen 325 mg tablet  Commonly known as: TYLENOL  Take 2 tablets (650 mg total) by mouth every 8 (eight) hours for 7 days.  Dose: 650 mg     hydrALAZINE 25 mg tablet  Commonly known as: APRESOLINE  Take 1 tablet (25 mg total) by mouth every 8 (eight) hours.  Dose: 25 mg     pantoprazole 40 mg EC tablet  Commonly known as: PROTONIX  Start taking on: December 6, 2021  Take 1 tablet (40 mg total) by mouth daily for 4 days Indications: stress ulcer prevention.  Dose: 40 mg     polyethylene glycol 17 gram packet  Commonly known as: MIRALAX  Start taking on: December 6, 2021  Take 17 g by mouth daily for 3 days.  Dose: 17 g     predniSONE 10 mg tablet  Commonly known as: DELTASONE  Start taking on: December 6, 2021  Take 2 tablets (20 mg total) by mouth daily for 2 days. Then 1 tab(10mg) oral daily for 2 days and stop  Dose: 20 mg     sodium bicarbonate 650 mg tablet  Take 1 tablet (650 mg total) by mouth daily.  Dose: 650 mg     tamsulosin 0.4 mg capsule  Commonly known as: FLOMAX  Start taking on: December 6,  2021  Take 1 capsule (0.4 mg total) by mouth daily.  Dose: 0.4 mg        CHANGE how you take these medications    amLODIPine 10 mg tablet  Commonly known as: NORVASC  Take 1 tablet (10 mg total) by mouth daily. Hold sbp<110  Dose: 10 mg  What changed:   · how much to take  · additional instructions     colchicine 0.6 mg tablet  Commonly known as: COLCRYS  Take 1 tablet (0.6 mg total) by mouth daily.  Dose: 0.6 mg  What changed:   · how much to take  · how to take this  · when to take this     furosemide 20 mg tablet  Commonly known as: LASIX  Take 1 tablet (20 mg total) by mouth daily.  Dose: 20 mg  What changed:   · how much to take  · how to take this  · when to take this     levothyroxine 25 mcg tablet  Commonly known as: SYNTHROID  Take 1.5 tablets (37.5 mcg total) by mouth daily.  Dose: 37.5 mcg  What changed: how much to take        CONTINUE taking these medications    apixaban 2.5 mg tablet  Commonly known as: ELIQUIS  Take 2.5 mg by mouth 2 times daily.  Dose: 2.5 mg     mirtazapine 15 mg tablet  Commonly known as: REMERON  Take 15 mg by mouth nightly.  Dose: 15 mg        STOP taking these medications    LORazepam 0.5 mg tablet  Commonly known as: ATIVAN     oxybutynin 5 mg tablet  Commonly known as: DITROPAN     TIVOZANIB ORAL             Medication List      START taking these medications    acetaminophen 325 mg tablet  Commonly known as: TYLENOL  Take 2 tablets (650 mg total) by mouth every 8 (eight) hours for 7 days.  Dose: 650 mg     hydrALAZINE 25 mg tablet  Commonly known as: APRESOLINE  Take 1 tablet (25 mg total) by mouth every 8 (eight) hours.  Dose: 25 mg     pantoprazole 40 mg EC tablet  Commonly known as: PROTONIX  Start taking on: December 6, 2021  Take 1 tablet (40 mg total) by mouth daily for 4 days Indications: stress ulcer prevention.  Dose: 40 mg     polyethylene glycol 17 gram packet  Commonly known as: MIRALAX  Start taking on: December 6, 2021  Take 17 g by mouth daily for 3  days.  Dose: 17 g     predniSONE 10 mg tablet  Commonly known as: DELTASONE  Start taking on: December 6, 2021  Take 2 tablets (20 mg total) by mouth daily for 2 days. Then 1 tab(10mg) oral daily for 2 days and stop  Dose: 20 mg     sodium bicarbonate 650 mg tablet  Take 1 tablet (650 mg total) by mouth daily.  Dose: 650 mg     tamsulosin 0.4 mg capsule  Commonly known as: FLOMAX  Start taking on: December 6, 2021  Take 1 capsule (0.4 mg total) by mouth daily.  Dose: 0.4 mg        CHANGE how you take these medications    amLODIPine 10 mg tablet  Commonly known as: NORVASC  Take 1 tablet (10 mg total) by mouth daily. Hold sbp<110  Dose: 10 mg  What changed:   · how much to take  · additional instructions     colchicine 0.6 mg tablet  Commonly known as: COLCRYS  Take 1 tablet (0.6 mg total) by mouth daily.  Dose: 0.6 mg  What changed:   · how much to take  · how to take this  · when to take this     furosemide 20 mg tablet  Commonly known as: LASIX  Take 1 tablet (20 mg total) by mouth daily.  Dose: 20 mg  What changed:   · how much to take  · how to take this  · when to take this     levothyroxine 25 mcg tablet  Commonly known as: SYNTHROID  Take 1.5 tablets (37.5 mcg total) by mouth daily.  Dose: 37.5 mcg  What changed: how much to take        CONTINUE taking these medications    apixaban 2.5 mg tablet  Commonly known as: ELIQUIS  Take 2.5 mg by mouth 2 times daily.  Dose: 2.5 mg     mirtazapine 15 mg tablet  Commonly known as: REMERON  Take 15 mg by mouth nightly.  Dose: 15 mg        STOP taking these medications    LORazepam 0.5 mg tablet  Commonly known as: ATIVAN     oxybutynin 5 mg tablet  Commonly known as: DITROPAN     TIVOZANIB ORAL               pROCEDURES / LABS / IMAGING      Disposition: Skilled Nursing Facility - Other   [facility] skilled nursing facility  Code Status At Discharge: Full Code    Total discharge time 35 minutes in discussing with patient and daughter regarding follow-up and also  discussing with nursing and case management and preparing paperwork    Physician Order for Life-Sustaining Treatment Document Status      No documents found     Follow Up:  With Primary MD within 1 week discharge from facility  Next steps: Follow up  Sloane casillas pcp in 1wk            Follow up with Gary Petersen MD  Specialty: Nephrology, Internal Medicine  Nephrology in 2wks.call and make an appnt [address]   [address]   [facility] [address]  [phone]          Follow up with Bobby Mathews MD  Specialty: Urology  Urology in 2-3wks [address]   [address]   [facility] [address]  [phone]          Follow up with Mao Verduzco MD  Specialty: Neurology, Electrodiagnostic Medicine  Neurology in 2-3wks.call and make an appnt [address]   [address]   [facility] [address]  [phone]          Follow up with Sloane Casillas MD  Specialty: Internal Medicine [address] [facility]   [facility] [address]  [phone]     Due to your recent medical condition(s) it is highly suggested that you have a sleep study performed after you return home from the hospital.  Please consult with your primary care provider (PCP) and discuss if a sleep study is right for you.     Bmp on 12/7/21 and every wk. Cbc every wk     Bladder scan q shift and straight cath if >400     F/u with oncology at [facility] in 1-2wks. Please f/u with oncology if needs to go back on his chemo med     Tsh,ft4 in 6wks. synthyroid dose was increased in hospital     If continues to have lt.leg weakness consider emg in 2-3wks with neurology     On prednisone taper for gout. Will be on protonix while on prednisone     norvasc dose increased and hydralazine added secondary to elevated blood pressure     Sodium bicarb stared here in hospital for metabolic acidosis     flomax started for urinary retention in hospital     Please help patient stand up to urinate

## 2022-01-01 ENCOUNTER — DOCUMENTATION (OUTPATIENT)
Dept: INTERNAL MEDICINE | Facility: CLINIC | Age: 82
End: 2022-01-01
Payer: MEDICARE

## 2022-01-01 ENCOUNTER — LAB REQUISITION (OUTPATIENT)
Dept: LAB | Facility: HOSPITAL | Age: 82
End: 2022-01-01
Attending: INTERNAL MEDICINE
Payer: MEDICARE

## 2022-01-01 ENCOUNTER — LAB REQUISITION (OUTPATIENT)
Dept: LAB | Facility: HOSPITAL | Age: 82
End: 2022-01-01

## 2022-01-01 DIAGNOSIS — M1A.9XX0 CHRONIC GOUT WITHOUT TOPHUS, UNSPECIFIED CAUSE, UNSPECIFIED SITE: ICD-10-CM

## 2022-01-01 DIAGNOSIS — R19.7 DIARRHEA, UNSPECIFIED TYPE: ICD-10-CM

## 2022-01-01 DIAGNOSIS — I48.0 PAROXYSMAL ATRIAL FIBRILLATION (CMS/HCC): ICD-10-CM

## 2022-01-01 DIAGNOSIS — N18.4 CHRONIC KIDNEY DISEASE, STAGE 4 (SEVERE) (CMS/HCC): ICD-10-CM

## 2022-01-01 DIAGNOSIS — R26.81 UNSTEADINESS ON FEET: ICD-10-CM

## 2022-01-01 DIAGNOSIS — E03.9 HYPOTHYROIDISM, UNSPECIFIED: ICD-10-CM

## 2022-01-01 DIAGNOSIS — I95.1 ORTHOSTATIC HYPOTENSION: Primary | ICD-10-CM

## 2022-01-01 DIAGNOSIS — C64.1 MALIGNANT NEOPLASM OF RIGHT KIDNEY (CMS/HCC): ICD-10-CM

## 2022-01-01 DIAGNOSIS — R55 SYNCOPE AND COLLAPSE: ICD-10-CM

## 2022-01-01 DIAGNOSIS — I95.1 ORTHOSTATIC HYPOTENSION: ICD-10-CM

## 2022-01-01 DIAGNOSIS — I10 ESSENTIAL (PRIMARY) HYPERTENSION: ICD-10-CM

## 2022-01-01 DIAGNOSIS — F32.A DEPRESSION, UNSPECIFIED DEPRESSION TYPE: ICD-10-CM

## 2022-01-01 DIAGNOSIS — K21.9 GASTROESOPHAGEAL REFLUX DISEASE WITHOUT ESOPHAGITIS: ICD-10-CM

## 2022-01-01 DIAGNOSIS — N18.4 CKD (CHRONIC KIDNEY DISEASE) STAGE 4, GFR 15-29 ML/MIN (CMS/HCC): ICD-10-CM

## 2022-01-01 DIAGNOSIS — E03.9 HYPOTHYROIDISM, UNSPECIFIED TYPE: ICD-10-CM

## 2022-01-01 DIAGNOSIS — I48.0 PAROXYSMAL A-FIB (CMS/HCC): ICD-10-CM

## 2022-01-01 DIAGNOSIS — R33.9 URINARY RETENTION: ICD-10-CM

## 2022-01-01 LAB
ALBUMIN SERPL-MCNC: 2.4 G/DL (ref 3.4–5)
ALP SERPL-CCNC: 142 IU/L (ref 35–126)
ALT SERPL-CCNC: 21 IU/L (ref 16–63)
ANION GAP SERPL CALC-SCNC: 10 MEQ/L (ref 3–15)
ANION GAP SERPL CALC-SCNC: 13 MEQ/L (ref 3–15)
ANION GAP SERPL CALC-SCNC: 7 MEQ/L (ref 3–15)
ANISOCYTOSIS BLD QL SMEAR: ABNORMAL
AST SERPL-CCNC: 32 IU/L (ref 15–41)
BASOPHILS # BLD: 0.02 K/UL (ref 0.01–0.1)
BASOPHILS NFR BLD: 0.6 %
BILIRUB SERPL-MCNC: 0.5 MG/DL (ref 0.3–1.2)
BUN SERPL-MCNC: 30 MG/DL (ref 8–20)
BUN SERPL-MCNC: 33 MG/DL (ref 8–20)
BUN SERPL-MCNC: 35 MG/DL (ref 8–20)
CALCIUM SERPL-MCNC: 8.3 MG/DL (ref 8.9–10.3)
CALCIUM SERPL-MCNC: 8.4 MG/DL (ref 8.9–10.3)
CALCIUM SERPL-MCNC: 8.8 MG/DL (ref 8.9–10.3)
CHLORIDE SERPL-SCNC: 105 MEQ/L (ref 98–109)
CHLORIDE SERPL-SCNC: 106 MEQ/L (ref 98–109)
CHLORIDE SERPL-SCNC: 109 MEQ/L (ref 98–109)
CO2 SERPL-SCNC: 20 MEQ/L (ref 22–32)
CO2 SERPL-SCNC: 23 MEQ/L (ref 22–32)
CO2 SERPL-SCNC: 24 MEQ/L (ref 22–32)
CREAT SERPL-MCNC: 2.1 MG/DL (ref 0.8–1.3)
CREAT SERPL-MCNC: 2.3 MG/DL (ref 0.8–1.3)
CREAT SERPL-MCNC: 2.4 MG/DL (ref 0.8–1.3)
DIFFERENTIAL METHOD BLD: ABNORMAL
EOSINOPHIL # BLD: 0.18 K/UL (ref 0.04–0.54)
EOSINOPHIL NFR BLD: 5.2 %
ERYTHROCYTE [DISTWIDTH] IN BLOOD BY AUTOMATED COUNT: 17.5 % (ref 11.6–14.4)
ERYTHROCYTE [DISTWIDTH] IN BLOOD BY AUTOMATED COUNT: 18.2 % (ref 11.6–14.4)
GFR SERPL CREATININE-BSD FRML MDRD: 26 ML/MIN/1.73M*2
GFR SERPL CREATININE-BSD FRML MDRD: 27.3 ML/MIN/1.73M*2
GFR SERPL CREATININE-BSD FRML MDRD: 30.3 ML/MIN/1.73M*2
GLUCOSE SERPL-MCNC: 132 MG/DL (ref 70–99)
GLUCOSE SERPL-MCNC: 83 MG/DL (ref 70–99)
GLUCOSE SERPL-MCNC: 87 MG/DL (ref 70–99)
HCT VFR BLDCO AUTO: 30.3 % (ref 40.1–51)
HCT VFR BLDCO AUTO: 32.5 % (ref 40.1–51)
HGB BLD-MCNC: 10 G/DL (ref 13.7–17.5)
HGB BLD-MCNC: 9.1 G/DL (ref 13.7–17.5)
IMM GRANULOCYTES # BLD AUTO: 0.02 K/UL (ref 0–0.08)
IMM GRANULOCYTES NFR BLD AUTO: 0.6 %
LYMPHOCYTES # BLD: 0.3 K/UL (ref 1.2–3.5)
LYMPHOCYTES NFR BLD: 8.7 %
MACROCYTES BLD QL SMEAR: ABNORMAL
MCH RBC QN AUTO: 31.2 PG (ref 28–33.2)
MCH RBC QN AUTO: 31.2 PG (ref 28–33.2)
MCHC RBC AUTO-ENTMCNC: 30 G/DL (ref 32.2–36.5)
MCHC RBC AUTO-ENTMCNC: 30.8 G/DL (ref 32.2–36.5)
MCV RBC AUTO: 101.2 FL (ref 83–98)
MCV RBC AUTO: 103.8 FL (ref 83–98)
MONOCYTES # BLD: 0.41 K/UL (ref 0.3–1)
MONOCYTES NFR BLD: 11.9 %
NEUTROPHILS # BLD: 2.52 K/UL (ref 1.7–7)
NEUTS SEG NFR BLD: 73 %
NRBC BLD-RTO: 0 %
OVALOCYTES BLD QL SMEAR: ABNORMAL
PDW BLD AUTO: 9.2 FL (ref 9.4–12.4)
PDW BLD AUTO: 9.5 FL (ref 9.4–12.4)
PLAT MORPH BLD: NORMAL
PLATELET # BLD AUTO: 134 K/UL (ref 150–350)
PLATELET # BLD AUTO: 138 K/UL (ref 150–350)
PLATELET # BLD EST: ABNORMAL 10*3/UL
POIKILOCYTOSIS BLD QL SMEAR: ABNORMAL
POLYCHROMASIA BLD QL SMEAR: ABNORMAL
POTASSIUM SERPL-SCNC: 3.8 MEQ/L (ref 3.6–5.1)
POTASSIUM SERPL-SCNC: 4 MEQ/L (ref 3.6–5.1)
POTASSIUM SERPL-SCNC: 4.3 MEQ/L (ref 3.6–5.1)
PROT SERPL-MCNC: 5.4 G/DL (ref 6–8.2)
RBC # BLD AUTO: 2.92 M/UL (ref 4.5–5.8)
RBC # BLD AUTO: 3.21 M/UL (ref 4.5–5.8)
SODIUM SERPL-SCNC: 138 MEQ/L (ref 136–144)
SODIUM SERPL-SCNC: 139 MEQ/L (ref 136–144)
SODIUM SERPL-SCNC: 140 MEQ/L (ref 136–144)
T4 FREE SERPL-MCNC: 1 NG/DL (ref 0.58–1.64)
TSH SERPL DL<=0.05 MIU/L-ACNC: 2.7 MIU/L (ref 0.34–5.6)
WBC # BLD AUTO: 3.35 K/UL (ref 3.8–10.5)
WBC # BLD AUTO: 3.45 K/UL (ref 3.8–10.5)

## 2022-01-01 PROCEDURE — 30099996 STAT DAYTIME VENIPUNCTURE: Performed by: INTERNAL MEDICINE

## 2022-01-01 PROCEDURE — 84443 ASSAY THYROID STIM HORMONE: CPT | Performed by: INTERNAL MEDICINE

## 2022-01-01 PROCEDURE — 85025 COMPLETE CBC W/AUTO DIFF WBC: CPT | Performed by: INTERNAL MEDICINE

## 2022-01-01 PROCEDURE — 80048 BASIC METABOLIC PNL TOTAL CA: CPT | Performed by: INTERNAL MEDICINE

## 2022-01-01 PROCEDURE — 36415 COLL VENOUS BLD VENIPUNCTURE: CPT | Performed by: INTERNAL MEDICINE

## 2022-01-01 PROCEDURE — 85027 COMPLETE CBC AUTOMATED: CPT | Performed by: INTERNAL MEDICINE

## 2022-01-01 PROCEDURE — 84439 ASSAY OF FREE THYROXINE: CPT | Performed by: INTERNAL MEDICINE

## 2022-01-01 PROCEDURE — 80053 COMPREHEN METABOLIC PANEL: CPT | Performed by: INTERNAL MEDICINE

## 2022-03-24 PROBLEM — R06.02 SOB (SHORTNESS OF BREATH) ON EXERTION: Status: RESOLVED | Noted: 2021-01-01 | Resolved: 2022-01-01

## 2022-03-24 PROBLEM — I10 HYPERTENSION: Status: RESOLVED | Noted: 2021-01-01 | Resolved: 2022-01-01

## 2022-03-24 PROBLEM — M1A.9XX0 CHRONIC GOUT: Status: ACTIVE | Noted: 2021-01-01

## 2022-03-24 PROBLEM — I95.1 ORTHOSTATIC HYPOTENSION: Status: ACTIVE | Noted: 2022-01-01

## 2022-03-24 PROBLEM — K21.9 GASTROESOPHAGEAL REFLUX DISEASE WITHOUT ESOPHAGITIS: Status: ACTIVE | Noted: 2022-01-01

## 2022-03-25 NOTE — ASSESSMENT & PLAN NOTE
Significant  Recently started on fludrocortisone  Unable to DC Flomax currently due to significant urinary retention recently  · Add midodrine 5 mg 3 times daily and monitor  · Labs next week

## 2022-03-25 NOTE — PROGRESS NOTES
[facility]    H&P      CC: Orthostatic hypotension     HPI: records reviewed.  Hx obtained from Nursing/aides also  Recently moved to [place].  Recently admitted to the hospital for syncope orthostatic hypotension.  His hydralazine, Lasix, Norvasc were stopped.  Recently started on fludrocortisone.  Also had urinary retention.  Currently off Cox and urinating okay.  He feels okay today.  Has been working with home care.  Still having significant orthostatic hypotension.  Trying to drink fluids     ROS: See HPI   No f/c   No HA, sore throat   No cough, sputum.   No CP, palpitations.   No abd pain, no n/v/d.   No dysuria,.   No polydipsia   No agitation or anxiety.   All other ROS negative     PMH: A. fib, CAD, HTN, renal cancer, thyroid disease, gout, depression     PsxH: Right nephrectomy     FMH: NC     SH: No current smoking alcohol abuse.  Former smoker     Allergies: reviewed     Meds: reviewed in chart     Exam:   VS: Sitting 112/68, 76, standing 64/47, 97, 18   Gen: NAD, alert, awake   HEENT: NC/AT, sclera anicteric   Neck: supple   CV: +s1, s2, RRR   Lungs: CTAB   Abd: s, NT, ND, +BS x4   : no CVA tenderness   Ext: No c/c/e   Neuro: no focal deficits, sensation intact, aox3   Psych: normal affect, pleasant   Skin; no rash     Pertinent Labs/diagnostics:    3/18/2022: CR 2.4  TSH WNL  Hgb 10.0    Renal ultrasound: Left kidney without hydronephrosis or internal renal calculus     A/P:   .  Orthostatic hypotension  Significant  Recently started on fludrocortisone  Unable to DC Flomax currently due to significant urinary retention recently  · Add midodrine 5 mg 3 times daily and monitor  · Labs next week    Paroxysmal A-fib (CMS/HCC)  Rate controlled without medications currently in sinus  · Continue Eliquis    Hypothyroidism  Recent TSH normal  · Continue levothyroxine    CKD (chronic kidney disease) stage 4, GFR 15-29 ml/min (CMS/HCC)  Only has 1 kidney  Baseline CR  2.4  · Continue bicarb      Renal cancer (CMS/HCC)  · Continue to follow-up with [facility]    Chronic gout  Continue colchicine    Urinary retention  Urinating okay currently  · Continue Flomax    Gastroesophageal reflux disease without esophagitis  Continue pantoprazole      JOVON CISNEROS MD

## 2022-03-30 NOTE — PROGRESS NOTES
Pt seen in [facility]    Progress note:     S: seen for orthostatic hypotension  Hx obtained from Nursing/aides also  Feels better.  Less lightheaded  No cp, sob, n/v/d, abd pain, dysuria, f/c    O:   Vitals:  137/72, sitting 191/58, standing 71/44, 98.4, 18  Gen: NAD, alert, awake   HEENT: NC/AT, sclera anicteric   Neck: supple   CV: +s1, s2, RRR   Lungs: CTAB   Abd: s, NT, ND, +BS x4   : no CVA tenderness   Ext: No c/c/e   Neuro: no focal deficits, aox3   Psych: normal affect, pleasant   Skin; no rash      A/P:  .  Orthostatic hypotension  Improving but still uncontrolled  Unable to DC Flomax currently due to significant urinary retention recently  · Continue fludrocortisone  · Increase midodrine to 5 mg 3 times daily          JOVON CISNEROS MD

## 2022-03-30 NOTE — ASSESSMENT & PLAN NOTE
Improving but still uncontrolled  Unable to DC Flomax currently due to significant urinary retention recently  · Continue fludrocortisone  · Increase midodrine to 5 mg 3 times daily

## 2022-04-06 NOTE — ASSESSMENT & PLAN NOTE
Still with orthostasis  Currently on midodrine 10 mg 3 times daily and Florinef 0.1 mg daily  · Increase Florinef to 0.2 mg daily  · Check BMP next week

## 2022-04-06 NOTE — PROGRESS NOTES
Pt seen in [facility]    Progress note:     S: seen for orthostatic hypotension  Hx obtained from Nursing/aides also  Feels better but still with lightheadedness  No cp, sob, n/v/d, abd pain, dysuria, f/c    O:   Vitals:    142/77, 70.  Sitting 104/67, 93.  Standing 82/49, 112  Gen: NAD, alert, awake   HEENT: NC/AT, sclera anicteric   Neck: supple   CV: +s1, s2, RRR   Lungs: CTAB   Abd: s, NT, ND, +BS x4   : no CVA tenderness   Ext: No c/c/e   Neuro: no focal deficits, aox3   Psych: normal affect, pleasant   Skin; no rash      A/P:  .  Orthostatic hypotension  Still with orthostasis  Currently on midodrine 10 mg 3 times daily and Florinef 0.1 mg daily  · Increase Florinef to 0.2 mg daily  · Check BMP next week        JOVON CISNEROS MD

## 2022-04-13 NOTE — ASSESSMENT & PLAN NOTE
Still dropping  Continue midodrine 10 mg 3 times daily  · Increase Florinef to 0.3 mg  · BMP next week

## 2022-04-13 NOTE — PROGRESS NOTES
Pt seen in [facility]    Progress note:     S: seen for orthostatic hypotension  Hx obtained from Nursing/aides also  Feels better.  Denies any lightheadedness  No cp, sob, n/v/d, abd pain, dysuria, f/c    O:   Vitals:  Lying 143/75, 69.  Sitting 123/71, 82.  Standing 78/52, 107  Gen: NAD, alert, awake   HEENT: NC/AT, sclera anicteric   Neck: supple   CV: +s1, s2, RRR   Lungs: CTAB   Abd: s, NT, ND, +BS x4   : no CVA tenderness   Ext: No c/c/e   Neuro: no focal deficits, aox3   Psych: normal affect, pleasant   Skin; no rash      A/P:  .  Orthostatic hypotension  Still dropping  Continue midodrine 10 mg 3 times daily  · Increase Florinef to 0.3 mg  · BMP next week        JOVON CISNEROS MD

## 2022-04-20 NOTE — PROGRESS NOTES
Pt seen in [facility]    Progress note:     S: seen for orthostatic hypotension  Hx obtained from Nursing/aides also  Feels better.  Denies any lightheadedness  No cp, sob, n/v/d, abd pain, dysuria, f/c    O:   Vitals:  Lying 161/79.  Sitting 120/75.  Standing 87/61 Gen: NAD, alert, awake   HEENT: NC/AT, sclera anicteric   Neck: supple   CV: +s1, s2, RRR   Lungs: CTAB   Abd: s, NT, ND, +BS x4   : no CVA tenderness   Ext: No c/c/e   Neuro: no focal deficits, aox3   Psych: normal affect, pleasant   Skin; no rash    CR 2.1, K4.0    A/P:  .  Orthostatic hypotension  Some improvement but still with significant drop while standing  · Continue midodrine 10 mg 3 times daily  · Continue Florinef 0.3 mg daily  · Add droxidopa 100 mg 3 times daily          JOVON CISNEROS MD

## 2022-04-20 NOTE — ASSESSMENT & PLAN NOTE
Some improvement but still with significant drop while standing  · Continue midodrine 10 mg 3 times daily  · Continue Florinef 0.3 mg daily  · Add droxidopa 100 mg 3 times daily

## 2022-04-28 PROBLEM — R19.7 DIARRHEA: Status: ACTIVE | Noted: 2022-01-01

## 2022-04-29 NOTE — PROGRESS NOTES
Pt seen in [facility]    Progress note:     S: seen for orthostatic hypotension, renal cancer, diarrhea  Hx obtained from Nursing/aides also  States he took droxidopa and started to have nausea and diarrhea  Spoke to daughter over the phone.  She is upset about multiple issues.  Patient mostly stays in his room.  Goes from bed to chair.  Refuses shower with aides here  No cp, sob,, abd pain, dysuria, f/c    O:   Vitals: Reviewed   HEENT: NC/AT, sclera anicteric   Neck: supple   CV: +s1, s2, RRR   Lungs: CTAB   Abd: s, NT, ND, +BS x4   : no CVA tenderness   Ext: No c/c/e   Neuro: no focal deficits, aox3   Psych: no agitation   Skin; no rash    CR 2.1, K4.0    Echo: 11/2021: EF 60 to 65%.  Grade 1 diastolic dysfunction.    A/P:  .  Orthostatic hypotension  Recently obtained prior authorization for droxidopa and he just started it this week  Unclear if current symptoms due to droxidopa  Daughter is upset that he is not making progress.    Discussed with daughter that his medications were slowly increased appropriately but he is still with severe orthostatic hypotension   · Can hold droxidopa for 2 days and then can retry  · Continue midodrine 10 mg 3 times daily and Florinef 0.3 mg daily  · Advised daughter to make appointment with his cardiologist for further management    Renal cancer (CMS/HCC)  Daughter states he can start chemotherapy if medically okay.  States he does not need to see oncology in person  Discussed that oncology may need to see patient physically as he has not been seen by them for several months.  Daughter does not want to take him to the office currently      Diarrhea  Unclear if secondary to droxidopa  · Monitor for now        JOVON CSINEROS MD

## 2022-04-29 NOTE — ASSESSMENT & PLAN NOTE
Daughter states he can start chemotherapy if medically okay.  States he does not need to see oncology in person  Discussed that oncology may need to see patient physically as he has not been seen by them for several months.  Daughter does not want to take him to the office currently

## 2022-04-29 NOTE — ASSESSMENT & PLAN NOTE
Recently obtained prior authorization for droxidopa and he just started it this week  Unclear if current symptoms due to droxidopa  Daughter is upset that he is not making progress.    Discussed with daughter that his medications were slowly increased appropriately but he is still with severe orthostatic hypotension   · Can hold droxidopa for 2 days and then can retry  · Continue midodrine 10 mg 3 times daily and Florinef 0.3 mg daily  · Advised daughter to make appointment with his cardiologist for further management

## 2022-05-03 PROBLEM — F32.A DEPRESSION: Status: ACTIVE | Noted: 2022-01-01

## 2022-05-04 NOTE — ASSESSMENT & PLAN NOTE
Improving but still drops.  Symptomatic  Already on midodrine and Florinef  Unable to tolerate droxidopa  Has appointment with cardiology now

## 2022-05-04 NOTE — PROGRESS NOTES
Pt seen in [facility]    Progress note:     S: seen for orthostatic hypotension, renal cancer, diarrhea  Hx obtained from Nursing/aides also  Unable to tolerate droxidopa.  States he gets diarrhea from his  Still gets lightheadedness when he stands up  Mood has been low  Poor appetite, poor sleep  No cp, sob,, abd pain, dysuria, f/c    O:   Vitals:  173/88, 67, sitting 108/69, 103, standing 97/59, 125   HEENT: NC/AT, sclera anicteric   Neck: supple   CV: +s1, s2, RRR   Lungs: CTAB   Abd: s, NT, ND, +BS x4   : no CVA tenderness   Ext: No c/c/e   Neuro: no focal deficits, aox3   Psych: no agitation   Skin; no rash    CR 2.1, K4.0    Echo: 11/2021: EF 60 to 65%.  Grade 1 diastolic dysfunction.    A/P:  .  Orthostatic hypotension  Improving but still drops.  Symptomatic  Already on midodrine and Florinef  Unable to tolerate droxidopa  Has appointment with cardiology now    Renal cancer (CMS/HCC)  Needs to follow-up with oncology    Depression  Increase mirtazapine to 30 mg        JOVON CISNEROS MD